# Patient Record
Sex: MALE | Race: WHITE | NOT HISPANIC OR LATINO | Employment: OTHER | URBAN - METROPOLITAN AREA
[De-identification: names, ages, dates, MRNs, and addresses within clinical notes are randomized per-mention and may not be internally consistent; named-entity substitution may affect disease eponyms.]

---
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
## 2019-01-07 ENCOUNTER — OFFICE VISIT (OUTPATIENT)
Dept: FAMILY MEDICINE CLINIC | Facility: CLINIC | Age: 19
End: 2019-01-07
Payer: COMMERCIAL

## 2019-01-07 VITALS
RESPIRATION RATE: 14 BRPM | SYSTOLIC BLOOD PRESSURE: 108 MMHG | WEIGHT: 154.2 LBS | HEART RATE: 70 BPM | DIASTOLIC BLOOD PRESSURE: 72 MMHG | HEIGHT: 66 IN | TEMPERATURE: 96.5 F | BODY MASS INDEX: 24.78 KG/M2

## 2019-01-07 DIAGNOSIS — M25.50 ARTHRALGIA, UNSPECIFIED JOINT: Primary | ICD-10-CM

## 2019-01-07 PROCEDURE — 3008F BODY MASS INDEX DOCD: CPT | Performed by: FAMILY MEDICINE

## 2019-01-07 PROCEDURE — 99213 OFFICE O/P EST LOW 20 MIN: CPT | Performed by: FAMILY MEDICINE

## 2019-01-07 RX ORDER — MELOXICAM 15 MG/1
15 TABLET ORAL DAILY
Qty: 30 TABLET | Refills: 1 | Status: SHIPPED | OUTPATIENT
Start: 2019-01-07 | End: 2019-02-25

## 2019-01-07 NOTE — PROGRESS NOTES
Assessment/Plan:    Arthralgia  Unclear cause  No signs of inflammation or infection  Pt to obtain labs done at [facility]  Trial of Meloxicam 15mg po qd  F/u with Rheum  Pt to call in 2 weeks with f/u  Pt to call for problems or concerns in the interim       Diagnoses and all orders for this visit:    Arthralgia, unspecified joint  -     meloxicam (MOBIC) 15 mg tablet; Take 1 tablet (15 mg total) by mouth daily          Subjective:      Patient ID: Magaly Mensah is a 22 y o  male  Pt states that his joints are unchanged  Seen by ortho and rheum - labs done were reportedly normal (results not available at time of visit)  Pain always worse the day after exertion and has noticed knee pain/stiffness after a hot shower  Symptoms seem to be sporadic  At worst, pain can cause a limp (6-7/10)  Has not taken any meds for pain  Pt denies morning stiffness, dry mouth, dry eyes or rashes  The following portions of the patient's history were reviewed and updated as appropriate:   He  has no past medical history on file  He   Patient Active Problem List    Diagnosis Date Noted    Arthralgia 66/22/5715    Elbow clicking 12/47/1946    Right elbow pain 07/13/2018    Elbow locking 07/13/2018    Patellofemoral pain syndrome of both knees 05/16/2018     He  has no past surgical history on file  He  reports that he has never smoked  He has never used smokeless tobacco  His alcohol and drug histories are not on file  Current Outpatient Prescriptions   Medication Sig Dispense Refill    meloxicam (MOBIC) 15 mg tablet Take 1 tablet (15 mg total) by mouth daily 30 tablet 1     No current facility-administered medications for this visit  He has No Known Allergies       Review of Systems   Constitutional: Negative  HENT: Negative  Eyes: Negative  Respiratory: Negative  Cardiovascular: Negative  Gastrointestinal: Negative  Genitourinary: Negative      Musculoskeletal: Positive for arthralgias, gait problem (occasional when knee pain is severe) and joint swelling  Negative for myalgias  Skin: Negative  Neurological: Negative for dizziness, weakness, light-headedness and numbness  Objective:      /72   Pulse 70   Temp (!) 96 5 °F (35 8 °C)   Resp 14   Ht 5' 6" (1 676 m)   Wt 69 9 kg (154 lb 3 2 oz)   BMI 24 89 kg/m²          Physical Exam   Constitutional: He is oriented to person, place, and time  He appears well-developed  HENT:   Head: Normocephalic  Mouth/Throat: Oropharynx is clear and moist    Eyes: Pupils are equal, round, and reactive to light  Conjunctivae and EOM are normal    Neck: Normal range of motion  Neck supple  Cardiovascular: Normal rate, regular rhythm, normal heart sounds and intact distal pulses  Pulmonary/Chest: Effort normal and breath sounds normal    Musculoskeletal: Normal range of motion  He exhibits no edema, tenderness or deformity  Lymphadenopathy:     He has no cervical adenopathy  Neurological: He is alert and oriented to person, place, and time  No cranial nerve deficit  Skin: Skin is warm

## 2019-01-08 NOTE — ASSESSMENT & PLAN NOTE
Unclear cause  No signs of inflammation or infection  Pt to obtain labs done at [facility]  Trial of Meloxicam 15mg po qd  F/u with Rheum  Pt to call in 2 weeks with f/u   Pt to call for problems or concerns in the interim

## 2019-01-22 ENCOUNTER — TELEPHONE (OUTPATIENT)
Dept: FAMILY MEDICINE CLINIC | Facility: CLINIC | Age: 19
End: 2019-01-22

## 2019-02-25 ENCOUNTER — OFFICE VISIT (OUTPATIENT)
Dept: RHEUMATOLOGY | Facility: CLINIC | Age: 19
End: 2019-02-25
Payer: COMMERCIAL

## 2019-02-25 VITALS
BODY MASS INDEX: 24.75 KG/M2 | HEART RATE: 74 BPM | DIASTOLIC BLOOD PRESSURE: 84 MMHG | HEIGHT: 66 IN | SYSTOLIC BLOOD PRESSURE: 120 MMHG | WEIGHT: 154 LBS

## 2019-02-25 DIAGNOSIS — M25.562 CHRONIC PAIN OF BOTH KNEES: ICD-10-CM

## 2019-02-25 DIAGNOSIS — M25.561 CHRONIC PAIN OF BOTH KNEES: ICD-10-CM

## 2019-02-25 DIAGNOSIS — G89.29 CHRONIC PAIN OF BOTH KNEES: ICD-10-CM

## 2019-02-25 DIAGNOSIS — M22.2X2 PATELLOFEMORAL PAIN SYNDROME OF BOTH KNEES: ICD-10-CM

## 2019-02-25 DIAGNOSIS — M25.521 BILATERAL ELBOW JOINT PAIN: Primary | ICD-10-CM

## 2019-02-25 DIAGNOSIS — M25.522 BILATERAL ELBOW JOINT PAIN: Primary | ICD-10-CM

## 2019-02-25 DIAGNOSIS — M22.2X1 PATELLOFEMORAL PAIN SYNDROME OF BOTH KNEES: ICD-10-CM

## 2019-02-25 PROCEDURE — 99213 OFFICE O/P EST LOW 20 MIN: CPT | Performed by: INTERNAL MEDICINE

## 2019-02-25 NOTE — PROGRESS NOTES
Assessment and Plan:   Patient is a 71-year-old male with no significant medical issues who presents for rheumatology follow-up regarding bilateral elbow and knee  Workup was reviewed with him as be low which revealed negative serologies for autoimmune diseases including negative HLA B27 and also normal inflammatory markers  His exam today is generally normal and he has no evidence of joint swelling or inflammatory arthritis on exam   The etiology of his elbow pain is unclear but does not appear related to inflammatory arthritis  Has had normal imaging of his elbows including a normal right elbow MRI  His knee pain seems like it could be consistent with patellofemoral syndrome and he has done physical therapy for this in the past which he reports did temporarily improve it immediately following physical therapy but then ultimately it went back to how it was previously  Given lack of evidence for an underlying inflammatory arthritis or autoimmune disease, I discussed that he does not need additional Rheumatology follow-up  Another option would be to return to sports medicine Orthopedics to see if an injection in his elbows would be beneficial, but it is unclear that this would help given the normal elbow imaging  Patient was agreeable with this plan and had no additional questions  Plan:  Diagnoses and all orders for this visit:    Bilateral elbow joint pain    Chronic pain of both knees    Patellofemoral pain syndrome of both knees        Follow-up plan:  No rheumatology follow up needed       Rheumatic Disease Summary  1   Polyarthralgia, livedo reticularis  -Rheum eval 11/2018 for non-inflammatory arthralgias worsened with activity (mainly elbows/knees), livedo reticularis  -XR elbows 2018: normal  -XR left knee 2018: moderate medial and lateral compartment narrowing without significant spurring  -MRI right elbow 7/2018: normal   -Labs 2018: negative BREE, RF, CCP, HLA-B27, SSA, SSB, Hep B/C, APLs PAIGE Bach is a 22 y o   male who presents for rheumatology follow-up regarding bilateral elbow and knee pain  I evaluated him last time for these complaints after he was seen by Sports Medicine, and there value a shin was normal with normal imaging, particularly right elbow MRI was completely normal   He had the lab workup I requested which was reviewed with him again today and was completely normal   He continues to complain of bilateral elbow pain and elbow clicking, which she notices most when he is using his elbows  He also has similar pain in his bilateral knees which is present when he is more active  Denies any morning stiffness whatsoever  Has not noticed any joint swelling  Denies any new joint complaints  He again denies any rashes, psoriasis, inflammatory bowel disease, or eye issues  He saw his primary care doctor in the meantime who prescribed meloxicam which she took for about 2 weeks but states he stopped it because it did not make a difference  He Is not taking any over-the-counter medications  He also states he is in the [facility] and is currently on full profile" which means he is under surveillance given his ongoing complaints  He is hopeful to come off profile and resume his normal activities and program     The following portions of the patient's history were reviewed and updated as appropriate: allergies, current medications, past family history, past medical history, past social history, past surgical history and problem list     Review of Systems:   Review of Systems   Constitutional: Negative for chills, fatigue, fever and unexpected weight change  HENT: Negative for mouth sores and trouble swallowing  Eyes: Negative for pain and visual disturbance  Respiratory: Negative for cough and shortness of breath  Cardiovascular: Negative for chest pain and leg swelling     Gastrointestinal: Negative for abdominal pain, blood in stool, constipation, diarrhea and nausea  Genitourinary: Negative for hematuria  Musculoskeletal: Positive for arthralgias  Negative for back pain, joint swelling and myalgias  Skin: Negative for rash  Neurological: Negative for weakness and numbness  Hematological: Negative for adenopathy  Psychiatric/Behavioral: Negative for sleep disturbance  Home Medications:  No current outpatient medications on file  Objective:    Vitals:    02/25/19 0924   BP: 120/84   BP Location: Left arm   Patient Position: Sitting   Cuff Size: Standard   Pulse: 74   Weight: 69 9 kg (154 lb)   Height: 5' 6" (1 676 m)       Physical Exam   Constitutional: He appears well-developed and well-nourished  He is cooperative  No distress  HENT:   Head: Normocephalic  Mouth/Throat: Oropharynx is clear and moist and mucous membranes are normal    Eyes: Conjunctivae and EOM are normal  No scleral icterus  Neck: No thyromegaly present  Pulmonary/Chest: No respiratory distress  Musculoskeletal:   No significant soft tissue tenderness  Elbows are generally nontender and remaining joints are nontender without swelling or synovitis  Has no significant joint hypermobility  Lymphadenopathy:     He has no cervical adenopathy  Neurological: He is alert  He has normal strength  Skin: Skin is warm and dry  No rash noted  Nails show no clubbing  Psychiatric: He has a normal mood and affect       Labs:   Labs reviewed: negative BREE, RF, CCP, HLA-B27, SSA, SSB, Hep B/C, APLs, normal TSH, CMP, CBC, ESR, CRP

## 2019-02-25 NOTE — LETTER
February 25, 2019     Penny Quevedo MD  [id number]  [address] [place] [address]    Patient: Rosemary Williamson   YOB: 2000   Date of Visit: 2/25/2019       Dear Dr Edmond Tidwell:    Thank you for referring John Barnhart to me for evaluation  Below are my notes for this consultation  If you have questions, please do not hesitate to call me  Sincerely,        DO Lula Cai DO  2/25/2019 10:07 AM  Signed  Assessment and Plan:   Patient is a 69-year-old male with no significant medical issues who presents for rheumatology follow-up regarding bilateral elbow and knee  Workup was reviewed with him as be low which revealed negative serologies for autoimmune diseases including negative HLA B27 and also normal inflammatory markers  His exam today is generally normal and he has no evidence of joint swelling or inflammatory arthritis on exam   The etiology of his elbow pain is unclear but does not appear related to inflammatory arthritis  Has had normal imaging of his elbows including a normal right elbow MRI  His knee pain seems like it could be consistent with patellofemoral syndrome and he has done physical therapy for this in the past which he reports did temporarily improve it immediately following physical therapy but then ultimately it went back to how it was previously  Given lack of evidence for an underlying inflammatory arthritis or autoimmune disease, I discussed that he does not need additional Rheumatology follow-up  Another option would be to return to sports medicine Orthopedics to see if an injection in his elbows would be beneficial, but it is unclear that this would help given the normal elbow imaging  Patient was agreeable with this plan and had no additional questions      Plan:  Diagnoses and all orders for this visit:    Bilateral elbow joint pain    Chronic pain of both knees    Patellofemoral pain syndrome of both knees        Follow-up plan:  No rheumatology follow up needed       Rheumatic Disease Summary  1  Polyarthralgia, livedo reticularis  -Rheum eval 11/2018 for non-inflammatory arthralgias worsened with activity (mainly elbows/knees), livedo reticularis  -XR elbows 2018: normal  -XR left knee 2018: moderate medial and lateral compartment narrowing without significant spurring  -MRI right elbow 7/2018: normal   -Labs 2018: negative BREE, RF, CCP, HLA-B27, SSA, SSB, Hep B/C, APLs     HPI  Malcolm Prieto is a 22 y o   male who presents for rheumatology follow-up regarding bilateral elbow and knee pain  I evaluated him last time for these complaints after he was seen by Sports Medicine, and there value a shin was normal with normal imaging, particularly right elbow MRI was completely normal   He had the lab workup I requested which was reviewed with him again today and was completely normal   He continues to complain of bilateral elbow pain and elbow clicking, which she notices most when he is using his elbows  He also has similar pain in his bilateral knees which is present when he is more active  Denies any morning stiffness whatsoever  Has not noticed any joint swelling  Denies any new joint complaints  He again denies any rashes, psoriasis, inflammatory bowel disease, or eye issues  He saw his primary care doctor in the meantime who prescribed meloxicam which she took for about 2 weeks but states he stopped it because it did not make a difference  He Is not taking any over-the-counter medications  He also states he is in the [facility] and is currently on full profile" which means he is under surveillance given his ongoing complaints    He is hopeful to come off profile and resume his normal activities and program     The following portions of the patient's history were reviewed and updated as appropriate: allergies, current medications, past family history, past medical history, past social history, past surgical history and problem list     Review of Systems:   Review of Systems   Constitutional: Negative for chills, fatigue, fever and unexpected weight change  HENT: Negative for mouth sores and trouble swallowing  Eyes: Negative for pain and visual disturbance  Respiratory: Negative for cough and shortness of breath  Cardiovascular: Negative for chest pain and leg swelling  Gastrointestinal: Negative for abdominal pain, blood in stool, constipation, diarrhea and nausea  Genitourinary: Negative for hematuria  Musculoskeletal: Positive for arthralgias  Negative for back pain, joint swelling and myalgias  Skin: Negative for rash  Neurological: Negative for weakness and numbness  Hematological: Negative for adenopathy  Psychiatric/Behavioral: Negative for sleep disturbance  Home Medications:  No current outpatient medications on file  Objective:    Vitals:    02/25/19 0924   BP: 120/84   BP Location: Left arm   Patient Position: Sitting   Cuff Size: Standard   Pulse: 74   Weight: 69 9 kg (154 lb)   Height: 5' 6" (1 676 m)       Physical Exam   Constitutional: He appears well-developed and well-nourished  He is cooperative  No distress  HENT:   Head: Normocephalic  Mouth/Throat: Oropharynx is clear and moist and mucous membranes are normal    Eyes: Conjunctivae and EOM are normal  No scleral icterus  Neck: No thyromegaly present  Pulmonary/Chest: No respiratory distress  Musculoskeletal:   No significant soft tissue tenderness  Elbows are generally nontender and remaining joints are nontender without swelling or synovitis  Has no significant joint hypermobility  Lymphadenopathy:     He has no cervical adenopathy  Neurological: He is alert  He has normal strength  Skin: Skin is warm and dry  No rash noted  Nails show no clubbing  Psychiatric: He has a normal mood and affect       Labs:   Labs reviewed: negative BREE, RF, CCP, HLA-B27, SSA, SSB, Hep B/C, APLs, normal TSH, CMP, CBC, ESR, CRP

## 2019-03-22 ENCOUNTER — OFFICE VISIT (OUTPATIENT)
Dept: FAMILY MEDICINE CLINIC | Facility: CLINIC | Age: 19
End: 2019-03-22
Payer: COMMERCIAL

## 2019-03-22 VITALS
WEIGHT: 152.2 LBS | HEART RATE: 74 BPM | SYSTOLIC BLOOD PRESSURE: 110 MMHG | DIASTOLIC BLOOD PRESSURE: 72 MMHG | TEMPERATURE: 97.7 F | BODY MASS INDEX: 24.46 KG/M2 | HEIGHT: 66 IN

## 2019-03-22 DIAGNOSIS — M25.50 ARTHRALGIA, UNSPECIFIED JOINT: Primary | ICD-10-CM

## 2019-03-22 DIAGNOSIS — R25.3 FASCICULATION: ICD-10-CM

## 2019-03-22 PROBLEM — F51.01 PRIMARY INSOMNIA: Status: ACTIVE | Noted: 2019-03-22

## 2019-03-22 PROCEDURE — 99214 OFFICE O/P EST MOD 30 MIN: CPT | Performed by: FAMILY MEDICINE

## 2019-03-22 PROCEDURE — 3008F BODY MASS INDEX DOCD: CPT | Performed by: FAMILY MEDICINE

## 2019-03-22 RX ORDER — AMITRIPTYLINE HYDROCHLORIDE 25 MG/1
25 TABLET, FILM COATED ORAL
Qty: 30 TABLET | Refills: 3 | Status: SHIPPED | OUTPATIENT
Start: 2019-03-22 | End: 2019-10-11

## 2019-03-22 NOTE — LETTER
To Whom It May Concern    Please excuse Amrik BatistaJennyfer from upcoming PT testing   He is undergoing evaluation for persistent joint pain      Sincerely            Lakeisha Quintana MD

## 2019-03-22 NOTE — ASSESSMENT & PLAN NOTE
Patient started on amitriptyline for his arthralgia pain  Will see if q h s  Dosing also helps with his insomnia  Patient will call in 1 week with follow-up    Recheck 2 months

## 2019-03-22 NOTE — ASSESSMENT & PLAN NOTE
Non inflammatory  Appreciate Rheumatology evaluation  Will try patient on amitriptyline 25 mg q h s  To see if we can improve joint pains  Patient will call in 1-2 weeks with follow-up    Recheck in 2 months

## 2019-03-22 NOTE — PROGRESS NOTES
Assessment/Plan:    Fasciculation  Exam unremarkable  Refer to Neuro for second opinion    Primary insomnia  Patient started on amitriptyline for his arthralgia pain  Will see if q h s  Dosing also helps with his insomnia  Patient will call in 1 week with follow-up  Recheck 2 months    Arthralgia  Non inflammatory  Appreciate Rheumatology evaluation  Will try patient on amitriptyline 25 mg q h s  To see if we can improve joint pains  Patient will call in 1-2 weeks with follow-up  Recheck in 2 months       Diagnoses and all orders for this visit:    Arthralgia, unspecified joint  -     amitriptyline (ELAVIL) 25 mg tablet; Take 1 tablet (25 mg total) by mouth daily at bedtime    Fasciculation  -     Ambulatory referral to Neurology; Future          Subjective:      Patient ID: Dominga Corea is a 22 y o  male  f/u multiple med issues  - pt continues to have diffuse joint aches  Has seen Rheum for second opinion - no inflammatory cause identified  Over the alst 2 months, pt notes scattered muscles fasciculations  No pain or cramping  Seem to be worse in the afternoon, comes in waves and is more noticeable at rest  Does not prevent activity/limit activity  Does not worsen with activity  No muscle pains  Occurs with peripheral muscles and facial muscles but not trunk    - has issues falling asleep, but not staying asleep  Has not noted increased anxiety or anhedonia/depression  - gets mild headaches when doing sit ups but not with other forms of exercise  No asymmetric weakness/numbness  - knees and elbows seems to be the most frequently painful joints, but can pain in others (ankles, hips)  The following portions of the patient's history were reviewed and updated as appropriate:   He  has no past medical history on file    He   Patient Active Problem List    Diagnosis Date Noted    Fasciculation 03/22/2019    Primary insomnia 03/22/2019    Arthralgia 25/33/4432    Elbow clicking 05/68/8358    Right elbow pain 07/13/2018    Elbow locking 07/13/2018    Patellofemoral pain syndrome of both knees 05/16/2018     He  has no past surgical history on file  He  reports that he has never smoked  He has never used smokeless tobacco  His alcohol and drug histories are not on file  Current Outpatient Medications   Medication Sig Dispense Refill    amitriptyline (ELAVIL) 25 mg tablet Take 1 tablet (25 mg total) by mouth daily at bedtime 30 tablet 3     No current facility-administered medications for this visit  He has No Known Allergies       Review of Systems   Constitutional: Negative  HENT: Negative  Eyes: Negative  Respiratory: Negative  Cardiovascular: Negative  Gastrointestinal: Negative  Genitourinary: Negative  Musculoskeletal: Positive for arthralgias, gait problem (occasional when knee pain is severe) and joint swelling  Negative for myalgias  Skin: Negative  Neurological: Negative for dizziness, weakness, light-headedness and numbness  Muscle fasciculations   Psychiatric/Behavioral: Positive for sleep disturbance  Negative for dysphoric mood  The patient is not nervous/anxious  Objective:      /72   Pulse 74   Temp 97 7 °F (36 5 °C)   Ht 5' 6" (1 676 m)   Wt 69 kg (152 lb 3 2 oz)   BMI 24 57 kg/m²          Physical Exam   Constitutional: He is oriented to person, place, and time  He appears well-developed  HENT:   Head: Normocephalic  Mouth/Throat: Oropharynx is clear and moist    Eyes: Pupils are equal, round, and reactive to light  Conjunctivae and EOM are normal    Neck: Normal range of motion  Neck supple  Cardiovascular: Normal rate, regular rhythm, normal heart sounds and intact distal pulses  Pulmonary/Chest: Effort normal and breath sounds normal    Musculoskeletal: Normal range of motion  He exhibits no edema, tenderness or deformity  Sl TTP over the medial epicodyles bilat   Rest (-)   Lymphadenopathy:     He has no cervical adenopathy  Neurological: He is alert and oriented to person, place, and time  No cranial nerve deficit  PHQ-2 = 0   Skin: Skin is warm

## 2019-03-27 NOTE — PROGRESS NOTES
DEPARTMENT OF NEUROLOGICAL SCIENCES  [address] and [facility]        NEW PATIENT EVALUATION NOTE    Patient: Lucretia Reyes  Medical Record Number: # [id number]  YOB: 2000  Date of visit: 3/29/2019    Referring provider: Compa Canales*    ASSESSMENT     Diagnoses for this encounter:  1  Benign fasciculations  Ambulatory referral to Neurology    carbamazepine (CARBATROL) 100 MG 12 hr capsule    Comprehensive metabolic panel    Magnesium    Vitamin B12    Vitamin D 25 hydroxy    PTH, intact    EMG 1 Upper/1 Lower Neuropathy    Calcium, ionized     Impression of this 21 yo M with ~ 3 month history of progressing muscle fasciculations reportedly across his body from his thumb before manifesting bilaterally in his upper legs, biceps and face  He also has the history of diffuse arthralgias, the severity and timing of which are not correlated with the onset or progression of the spasms  I feel they are two separate issues at this point  Today I was unable to fully witness any of these twitches personally, though he subjectively felt them in his R calf  His neuro exam showed normal strength testing, normal bulk and tone of limbs, hypoactive to absent reflexes bilaterally, and no neuropathic sensory changes  This is reassuring and given his history, most resembles a benign fasciculation syndrome  He feels the same muscle groups are involved each time  There are no upper motor neuron signs to support a degenerative process such as ALS or PLS per se, but the rapid spread of limb involvement is the only concerning aspect  Twitches are more reliably manifest in the biceps and gastrocnemius he feels  He does not have associated cramping pain; pain is localized to the major joints themselves  Will proceed as below  PLAN     · Reviewed the relevant previous lab work in [address] [place] [address] Rd  · Will check on CMP electrolyte panel, magnesium, PTH, vit D, vit B12     · No recent or relevant neuroimaging results to review  He had normal scans of his limbs  If develops upper motor neuron signs, consider MRI cervical spine  · Will proceed with an EMG of the LEs (calves) though he has them in biceps too  · Currently the twitches are more widespread, but not interfering with his daily life  They are annoying to him so will attempt trial of carbamazepine with baclofen as alternative  · Start on carbamazepine 100mg twice a day for symptomatic treatment of fasciculations  We do not think the joint pain is related to the fasciculations  · Thank you very much for sending me this interesting patient  · The patient has been instructed to call us about any new neurological problems or medication side effects  · Return to Clinic in 3-4 months after above completed  A total of 60 minutes were spent face-to-face with this patient, of which 25% was spent on counseling and coordination of care  We discussed the natural history of the patient's condition, differential diagnosis, level of diagnostic certainty, treatment alternatives and their side effects and possible complications  HISTORY OF PRESENT ILLNESS:     Mr Clarice De Leon is a 22 y o  right handed male who has been referred to the [facility] for 2nd opinion of muscle twitches  The patient was not accompanied today  History was obtained from patient    Main bothersome neurological symptoms today are:   1  Various muscle twitching  He tells me his L thumb started twitching Winter 2018 then spreading to both arms, and then both calves and hamstrings  In the last week or so, he feels twitches on his abdomen in the same muscle group and his face (mainly L > R) in cheeks  He denies noticing any overt weakness of the arms or legs  The twitching in the upper extremities appear only in the biceps and in thumbs, equally  In the LEs appears in the calves and hamstrings   In the face, twitches are below eyes in the cheek and lip, L>R  These occur upwards of several times a day and randomly  He is able to walk normally and carry out his daily life tasks well  He denies previous injury to the neck or head  Twitches are not triggered on any position change or time of day  Pt complains of diffuse joint aches since early 2018 of thus far unknown etiology  Has seen Rheumatology twice for second opinion - no inflammatory cause identified  He has seen Orthopedic Surgery as well with no clear etiology of an orthopedic nature  He has had negative serologies for autoimmune diseases including negative HLA B27 and also normal inflammatory markers  Negative Lyme and Hepatitis C  Normal imaging of his elbows including a normal right elbow MRI  Previous PT helped with knee pain temporarily, but then returned to previous level  - He endorses issues falling asleep, but not staying asleep  Has not noted increased anxiety or anhedonia/depression  - He gets mild headaches when doing sit ups but not with other forms of exercise  No asymmetric weakness/numbness  - His knees and elbows seems to be the most frequently painful joints, but can pain in others (ankles, hips)  - his joints ache more after use, but sometimes aches can occur without provocation    - He does not notice any correlation between the severity of the joint pain with the degree and extent of muscle twitching    - Family history of "weird looking joints" in elbows of both brother and father, and brother cannot rotate his palms upward  - He was started on Elavil 25mg qHS as a trial  He feels that the pain is better with this, but no effect on twitching  He tried meloxicam earlier for the joint pain, without benefit  He has not tried baclofen nor tizanidine before  Living Situation + ADLs: Goes to college studying mechanical tech  Academics good  He feels he is eating well, drinking less than 64 oz of water   He does endorse sleeping regularly at 10-11pm and wakening 6-8am  He is otherwise capable of performing all his daily tasks ADLs and IADLs  Family History: Number of First Degree Relatives With Parkinsonism/Dementia:  Paternal Grandfather with possible Parkinson's and dementia  REVIEW OF PAST MEDICAL, SOCIAL AND FAMILY HISTORY:  This is the list of problems as per our Medical Records:    Patient Active Problem List    Diagnosis Date Noted    Fasciculation 03/22/2019    Primary insomnia 03/22/2019    Arthralgia 89/31/2031    Elbow clicking 48/05/0793    Right elbow pain 07/13/2018    Elbow locking 07/13/2018    Patellofemoral pain syndrome of both knees 05/16/2018     History reviewed  No pertinent past medical history  History reviewed  No pertinent surgical history  No Known Allergies     Outpatient Encounter Medications as of 3/29/2019   Medication Sig Dispense Refill    amitriptyline (ELAVIL) 25 mg tablet Take 1 tablet (25 mg total) by mouth daily at bedtime 30 tablet 3    carbamazepine (CARBATROL) 100 MG 12 hr capsule Take 1 capsule (100 mg total) by mouth 2 (two) times a day 60 capsule 3     No facility-administered encounter medications on file as of 3/29/2019  Social History     Tobacco Use    Smoking status: Never Smoker    Smokeless tobacco: Never Used   Substance Use Topics    Alcohol use: Not on file      Family History   Problem Relation Age of Onset    No Known Problems Mother     Parkinsonism Paternal Grandfather       REVIEW OF SYSTEMS:  The patient has entered data on an intake form regarding present illness, past medical and surgical history, medications, allergies, family and social history, and a full review of 14 systems  I have reviewed this form with the patient, and all the relevant information has been included on this note  The full review of systems was negative except as stated in HPI and below  Constitutional: Negative  Negative for appetite change and fever  HENT: Negative    Negative for hearing loss, tinnitus, trouble swallowing and voice change  Eyes: Negative  Negative for photophobia and pain  Respiratory: Negative  Negative for shortness of breath  Cardiovascular: Negative  Negative for palpitations  Gastrointestinal: Negative  Negative for nausea  Endocrine: Negative  Negative for cold intolerance and heat intolerance  Genitourinary: Negative  Negative for dysuria, frequency and urgency  Musculoskeletal: Positive for myalgias  Negative for neck pain  Skin: Negative  Allergic/Immunologic: Negative  Neurological: Negative for dizziness, tremors, seizures, syncope, facial asymmetry, speech difficulty, weakness and numbness  Positive for twitching  Hematological: Negative  Does not bruise/bleed easily  Psychiatric/Behavioral: Negative  Negative for confusion and hallucinations    PHYSICAL EXAMINATION:     Vital signs:  /70 (BP Location: Left arm, Patient Position: Sitting, Cuff Size: Large)   Pulse 96   Ht 5' 6" (1 676 m)   Wt 69 1 kg (152 lb 6 4 oz)   BMI 24 60 kg/m²     General:  Depressed affect with monotone speech but otherwise well-appearing, well nourished, pleasant patient in no acute distress  Mood and Fund of Knowledge are appropriate  Head:  Normocephalic, atraumatic  Oropharynx and conjunctiva are clear  Speech  No hypophonia, no bradylalia  No scanning speech  Language: Comprehension intact  Neck:  Supple, strong 5/5 forward flexion and retroflexion  Extremities: Range of motion is normal       Cognitive and Mental Exam:  The patient is alert, oriented to self, location, date and situation  Memory is normal to provide accurate details of health history    Cranial Nerves:  CN II:  Direct and consensual light reflexes were equally reactive to light symmetrically  No afferent pupillary defect   Visual fields are full to confrontation  CN III / IV / VI: Extraocular movements were full, with normal pursuit and saccades      CN V: Facial sensation to light touch was intact  CN VII: Face is symmetric with normal strength  CN VIII: Hearing was assessed using the Calibrated Finger Rub Auditory Screening Test (CALFRAST) and was not abnormal (Better than CALFRAST-Strong-70)  CN X:   Palate is up going bilaterally and symmetrically  CN XI:  Neck muscles are strong  CN XII: Tongue protrusion is at midline with normal movements  No dysarthria  Motor:    Dystonia: none  Dyskinesia: none  Myoclonus: none  Chorea: none  Tics: none  MDS-UPDRS III:   Score 0  Speech: 0  Facial Expression: 0  Tremor (Head):  0  Rest Tremor Severity (RUE/LUE/RLE/LLE/Lip): 0/0/0/0/0  Action Tremor of Hands (R): 0  Action Tremor of Hands (L): 0  Finger tapping (R): 0  Finger tapping (L): 0  Hand clenching (R): 0  Hand clenching (L): 0  TISHA Hand (R): 0  TISHA Hand (L): 0  Toe Tapping (R):  0  Toe Tapping (L):  0  Leg Agility (R):  0  Leg Agility (L):  0  Rigidity (Neck): 0  Rigidity (RUE): 0  Rigidity (LUE): 0  Rigidity (RLE): 0  Rigidity (LLE): 0  Arising From Chair: 0  Posture: 0  Gait: 0  Freezing of Gait: 0  Postural Stability: -  Body Bradykinesia: 0  -------------------------------------------------------------------------------------    No fasciculations seen or palpated on the trunk, either limb, legs or face during interview  Unable to bring out with distraction or various activity / positions  Normal symmetrical muscle bulk and tone  No overt joint swelling or erythema  We were able to assess joints without issue       Muscle Strength Right Left  Muscle Strength Right Left   Deltoid 5/5 5/5  Hip Adductors 5/5 5/5   Biceps 5/5 5/5  Hip Abductors 5/5 5/5   Triceps 5/5 5/5  Knee Extensors 5/5 5/5   Wrist Extensors 5/5 5/5  Knee Flexors 5/5 5/5   Wrist Flexors 5/5 5/5  Ankle Extensors 5/5 5/5    5/5 5/5  Ankle Flexors 5/5 5/5   Finger Abductors 5/5 5/5       Hip Flexors 5/5 5/5   Hip Extensors 5/5 5/5     Sensory  Intact to Light Touch, Temperature, and vibration sense in all extremities  Coordination:  Finger-to-nose-finger: normal     Gait:  Normal comprehensive gait evaluation, has normal raising, stance, gait, turns, tandem gait, tip-toes, heels  Reflexes:    Right Left   Biceps 1/4 1/4   Brachioradialis 0/4 0/4   Triceps 1/4 1/4   Knee 0/4 0/4   Ankle 0/4 0/4      Plantar cutaneous reflex:  Right: mute  Left: mute      REVIEW OF ANCILLARY TESTS:   No results found for this or any previous visit

## 2019-03-29 ENCOUNTER — CONSULT (OUTPATIENT)
Dept: NEUROLOGY | Facility: CLINIC | Age: 19
End: 2019-03-29
Payer: COMMERCIAL

## 2019-03-29 VITALS
WEIGHT: 152.4 LBS | BODY MASS INDEX: 24.49 KG/M2 | SYSTOLIC BLOOD PRESSURE: 108 MMHG | HEIGHT: 66 IN | DIASTOLIC BLOOD PRESSURE: 70 MMHG | HEART RATE: 96 BPM

## 2019-03-29 DIAGNOSIS — R25.3 BENIGN FASCICULATIONS: Primary | ICD-10-CM

## 2019-03-29 DIAGNOSIS — M25.562 ARTHRALGIA OF BOTH KNEES: ICD-10-CM

## 2019-03-29 DIAGNOSIS — M25.561 ARTHRALGIA OF BOTH KNEES: ICD-10-CM

## 2019-03-29 PROCEDURE — 99244 OFF/OP CNSLTJ NEW/EST MOD 40: CPT | Performed by: PSYCHIATRY & NEUROLOGY

## 2019-03-29 RX ORDER — CARBAMAZEPINE 100 MG/1
100 CAPSULE, EXTENDED RELEASE ORAL 2 TIMES DAILY
Qty: 60 CAPSULE | Refills: 3 | Status: SHIPPED | OUTPATIENT
Start: 2019-03-29 | End: 2019-10-11

## 2019-03-29 NOTE — PATIENT INSTRUCTIONS
· Reviewed the relevant previous lab work in [employer]  · Will check on CMP electrolyte panel, magnesium, PTH, vit D, vit B12  · No recent or relevant neuroimaging results to review  He had normal scans of his limbs  · Will proceed with an EMG of the LEs (calves) though he has them in biceps too  · Currently the twitches are more widespread, but not interfering with his daily life  They are annoying to him so will attempt trial of carbamazepine with baclofen as alternative  · Start on carbamazepine 100mg twice a day for symptomatic treatment of fasciculations  We do not think the joint pain is related to the fasciculations  · Thank you very much for sending me this interesting patient  · The patient has been instructed to call us about any new neurological problems or medication side effects  · Return to Clinic in 3-4 months after above completed

## 2019-03-29 NOTE — LETTER
March 29, 2019     Julia Milton MD  [address]    Patient: Jesus Martinez   YOB: 2000   Date of Visit: 3/29/2019       Dear Dr Niranjan Chanel:    Thank you for referring Domitila Rivera to me for evaluation  Below are my notes for this consultation  If you have questions, please do not hesitate to call me  I look forward to following your patient along with you  Sincerely,        Rhonda Torres MD        CC: No Recipients  Denise Flavin  3/29/2019  8:30 AM  Sign at close encounter  Review of Systems   Constitutional: Negative  Negative for appetite change and fever  HENT: Negative  Negative for hearing loss, tinnitus, trouble swallowing and voice change  Eyes: Negative  Negative for photophobia and pain  Respiratory: Negative  Negative for shortness of breath  Cardiovascular: Negative  Negative for palpitations  Gastrointestinal: Negative  Negative for nausea  Endocrine: Negative  Negative for cold intolerance and heat intolerance  Genitourinary: Negative  Negative for dysuria, frequency and urgency  Musculoskeletal: Positive for myalgias  Negative for neck pain  Skin: Negative  Allergic/Immunologic: Negative  Neurological: Negative for dizziness, tremors, seizures, syncope, facial asymmetry, speech difficulty, weakness and numbness  Positive for twitching     Hematological: Negative  Does not bruise/bleed easily  Psychiatric/Behavioral: Negative  Negative for confusion and hallucinations         Rhonda Torres MD  3/29/2019 12:32 PM  Sign at close encounter  [address]        NEW PATIENT EVALUATION NOTE    Patient: Jesus Martinez  Medical Record Number: # [id number]  YOB: 2000  Date of visit: 3/29/2019    Referring provider: Cheo Benitez    ASSESSMENT     Diagnoses for this encounter:  1  Benign fasciculations  Ambulatory referral to Neurology    carbamazepine (CARBATROL) 100 MG 12 hr capsule    Comprehensive metabolic panel    Magnesium    Vitamin B12    Vitamin D 25 hydroxy    PTH, intact    EMG 1 Upper/1 Lower Neuropathy    Calcium, ionized     Impression of this 23 yo M with ~ 3 month history of progressing muscle fasciculations reportedly across his body from his thumb before manifesting bilaterally in his upper legs, biceps and face  He also has the history of diffuse arthralgias, the severity and timing of which are not correlated with the onset or progression of the spasms  I feel they are two separate issues at this point  Today I was unable to fully witness any of these twitches personally, though he subjectively felt them in his R calf  His neuro exam showed normal strength testing, normal bulk and tone of limbs, hypoactive to absent reflexes bilaterally, and no neuropathic sensory changes  This is reassuring and given his history, most resembles a benign fasciculation syndrome  He feels the same muscle groups are involved each time  There are no upper motor neuron signs to support a degenerative process such as ALS or PLS per se, but the rapid spread of limb involvement is the only concerning aspect  Twitches are more reliably manifest in the biceps and gastrocnemius he feels  He does not have associated cramping pain; pain is localized to the major joints themselves  Will proceed as below  PLAN     · Reviewed the relevant previous lab work in [address] [place] [address] Rd  · Will check on CMP electrolyte panel, magnesium, PTH, vit D, vit B12  · No recent or relevant neuroimaging results to review  He had normal scans of his limbs  If develops upper motor neuron signs, consider MRI cervical spine  · Will proceed with an EMG of the LEs (calves) though he has them in biceps too  · Currently the twitches are more widespread, but not interfering with his daily life   They are annoying to him so will attempt trial of carbamazepine with baclofen as alternative  · Start on carbamazepine 100mg twice a day for symptomatic treatment of fasciculations  We do not think the joint pain is related to the fasciculations  · Thank you very much for sending me this interesting patient  · The patient has been instructed to call us about any new neurological problems or medication side effects  · Return to Clinic in 3-4 months after above completed  A total of 60 minutes were spent face-to-face with this patient, of which 25% was spent on counseling and coordination of care  We discussed the natural history of the patient's condition, differential diagnosis, level of diagnostic certainty, treatment alternatives and their side effects and possible complications  HISTORY OF PRESENT ILLNESS:     Mr Wilton Ruffin is a 22 y o  right handed male who has [facility] [address] [place] [address] for 2nd opinion of muscle twitches  The patient was not accompanied today  History was obtained from patient    Main bothersome neurological symptoms today are:   1  Various muscle twitching  He tells me his L thumb started twitching Winter 2018 then spreading to both arms, and then both calves and hamstrings  In the last week or so, he feels twitches on his abdomen in the same muscle group and his face (mainly L > R) in cheeks  He denies noticing any overt weakness of the arms or legs  The twitching in the upper extremities appear only in the biceps and in thumbs, equally  In the LEs appears in the calves and hamstrings  In the face, twitches are below eyes in the cheek and lip, L>R  These occur upwards of several times a day and randomly  He is able to walk normally and carry out his daily life tasks well  He denies previous injury to the neck or head  Twitches are not triggered on any position change or time of day       Pt complains of diffuse joint aches since early 2018 of thus far unknown etiology  Has seen Rheumatology twice for second opinion - no inflammatory cause identified  He has seen Orthopedic Surgery as well with no clear etiology of an orthopedic nature  He has had negative serologies for autoimmune diseases including negative HLA B27 and also normal inflammatory markers  Negative Lyme and Hepatitis C  Normal imaging of his elbows including a normal right elbow MRI  Previous PT helped with knee pain temporarily, but then returned to previous level  - He endorses issues falling asleep, but not staying asleep  Has not noted increased anxiety or anhedonia/depression  - He gets mild headaches when doing sit ups but not with other forms of exercise  No asymmetric weakness/numbness  - His knees and elbows seems to be the most frequently painful joints, but can pain in others (ankles, hips)  - his joints ache more after use, but sometimes aches can occur without provocation    - He does not notice any correlation between the severity of the joint pain with the degree and extent of muscle twitching    - Family history of "weird looking joints" in elbows of both brother and father, and brother cannot rotate his palms upward  - He was started on Elavil 25mg qHS as a trial  He feels that the pain is better with this, but no effect on twitching  He tried meloxicam earlier for the joint pain, without benefit  He has not tried baclofen nor tizanidine before  Living Situation + ADLs: Goes to college studying mechanical tech  Academics good  He feels he is eating well, drinking less than 64 oz of water  He does endorse sleeping regularly at 10-11pm and wakening 6-8am  He is otherwise capable of performing all his daily tasks ADLs and IADLs  Family History: Number of First Degree Relatives With Parkinsonism/Dementia:  Paternal Grandfather with possible Parkinson's and dementia       REVIEW OF PAST MEDICAL, SOCIAL AND FAMILY HISTORY:  This is the list of problems as per our Medical Records:    Patient Active Problem List    Diagnosis Date Noted    Fasciculation 03/22/2019    Primary insomnia 03/22/2019    Arthralgia 00/82/8055    Elbow clicking 95/88/2619    Right elbow pain 07/13/2018    Elbow locking 07/13/2018    Patellofemoral pain syndrome of both knees 05/16/2018     History reviewed  No pertinent past medical history  History reviewed  No pertinent surgical history  No Known Allergies     Outpatient Encounter Medications as of 3/29/2019   Medication Sig Dispense Refill    amitriptyline (ELAVIL) 25 mg tablet Take 1 tablet (25 mg total) by mouth daily at bedtime 30 tablet 3    carbamazepine (CARBATROL) 100 MG 12 hr capsule Take 1 capsule (100 mg total) by mouth 2 (two) times a day 60 capsule 3     No facility-administered encounter medications on file as of 3/29/2019  Social History     Tobacco Use    Smoking status: Never Smoker    Smokeless tobacco: Never Used   Substance Use Topics    Alcohol use: Not on file      Family History   Problem Relation Age of Onset    No Known Problems Mother     Parkinsonism Paternal Grandfather       REVIEW OF SYSTEMS:  The patient has entered data on an intake form regarding present illness, past medical and surgical history, medications, allergies, family and social history, and a full review of 14 systems  I have reviewed this form with the patient, and all the relevant information has been included on this note  The full review of systems was negative except as stated in HPI and below  Constitutional: Negative  Negative for appetite change and fever  HENT: Negative  Negative for hearing loss, tinnitus, trouble swallowing and voice change  Eyes: Negative  Negative for photophobia and pain  Respiratory: Negative  Negative for shortness of breath  Cardiovascular: Negative  Negative for palpitations  Gastrointestinal: Negative  Negative for nausea  Endocrine: Negative    Negative for cold intolerance and heat intolerance  Genitourinary: Negative  Negative for dysuria, frequency and urgency  Musculoskeletal: Positive for myalgias  Negative for neck pain  Skin: Negative  Allergic/Immunologic: Negative  Neurological: Negative for dizziness, tremors, seizures, syncope, facial asymmetry, speech difficulty, weakness and numbness  Positive for twitching  Hematological: Negative  Does not bruise/bleed easily  Psychiatric/Behavioral: Negative  Negative for confusion and hallucinations    PHYSICAL EXAMINATION:     Vital signs:  /70 (BP Location: Left arm, Patient Position: Sitting, Cuff Size: Large)   Pulse 96   Ht 5' 6" (1 676 m)   Wt 69 1 kg (152 lb 6 4 oz)   BMI 24 60 kg/m²      General:  Depressed affect with monotone speech but otherwise well-appearing, well nourished, pleasant patient in no acute distress  Mood and Fund of Knowledge are appropriate  Head:  Normocephalic, atraumatic  Oropharynx and conjunctiva are clear  Speech  No hypophonia, no bradylalia  No scanning speech  Language: Comprehension intact  Neck:  Supple, strong 5/5 forward flexion and retroflexion  Extremities: Range of motion is normal       Cognitive and Mental Exam:  The patient is alert, oriented to self, location, date and situation  Memory is normal to provide accurate details of health history    Cranial Nerves:  CN II:  Direct and consensual light reflexes were equally reactive to light symmetrically  No afferent pupillary defect   Visual fields are full to confrontation  CN III / IV / VI: Extraocular movements were full, with normal pursuit and saccades  CN V:   Facial sensation to light touch was intact  CN VII: Face is symmetric with normal strength  CN VIII: Hearing was assessed using the Calibrated Finger Rub Auditory Screening Test (CALFRAST) and was not abnormal (Better than CALFRAST-Strong-70)  CN X:   Palate is up going bilaterally and symmetrically     CN XI:  Neck muscles are strong  CN XII: Tongue protrusion is at midline with normal movements  No dysarthria  Motor:    Dystonia: none  Dyskinesia: none  Myoclonus: none  Chorea: none  Tics: none  MDS-UPDRS III:   Score 0  Speech: 0  Facial Expression: 0  Tremor (Head):  0  Rest Tremor Severity (RUE/LUE/RLE/LLE/Lip): 0/0/0/0/0  Action Tremor of Hands (R): 0  Action Tremor of Hands (L): 0  Finger tapping (R): 0  Finger tapping (L): 0  Hand clenching (R): 0  Hand clenching (L): 0  TISHA Hand (R): 0  TISHA Hand (L): 0  Toe Tapping (R):   0  Toe Tapping (L):   0  Leg Agility (R):   0  Leg Agility (L):   0  Rigidity (Neck): 0  Rigidity (RUE): 0  Rigidity (LUE): 0  Rigidity (RLE): 0  Rigidity (LLE): 0  Arising From Chair: 0  Posture: 0  Gait: 0  Freezing of Gait: 0  Postural Stability: -  Body Bradykinesia: 0  -------------------------------------------------------------------------------------    No fasciculations seen or palpated on the trunk, either limb, legs or face during interview  Unable to bring out with distraction or various activity / positions  Normal symmetrical muscle bulk and tone  No overt joint swelling or erythema  We were able to assess joints without issue  Muscle Strength Right Left  Muscle Strength Right Left   Deltoid 5/5 5/5  Hip Adductors 5/5 5/5   Biceps 5/5 5/5  Hip Abductors 5/5 5/5   Triceps 5/5 5/5  Knee Extensors 5/5 5/5   Wrist Extensors 5/5 5/5  Knee Flexors 5/5 5/5   Wrist Flexors 5/5 5/5  Ankle Extensors 5/5 5/5    5/5 5/5  Ankle Flexors 5/5 5/5   Finger Abductors 5/5 5/5       Hip Flexors 5/5 5/5   Hip Extensors 5/5 5/5     Sensory  Intact to Light Touch, Temperature, and vibration sense in all extremities  Coordination:  Finger-to-nose-finger: normal     Gait:  Normal comprehensive gait evaluation, has normal raising, stance, gait, turns, tandem gait, tip-toes, heels        Reflexes:    Right Left   Biceps 1/4 1/4   Brachioradialis 0/4 0/4   Triceps 1/4 1/4   Knee 0/4 0/4   Ankle 0/4 0/4      Plantar cutaneous reflex:  Right: mute  Left: mute      REVIEW OF ANCILLARY TESTS:   No results found for this or any previous visit

## 2019-03-29 NOTE — PROGRESS NOTES
Review of Systems   Constitutional: Negative  Negative for appetite change and fever  HENT: Negative  Negative for hearing loss, tinnitus, trouble swallowing and voice change  Eyes: Negative  Negative for photophobia and pain  Respiratory: Negative  Negative for shortness of breath  Cardiovascular: Negative  Negative for palpitations  Gastrointestinal: Negative  Negative for nausea  Endocrine: Negative  Negative for cold intolerance and heat intolerance  Genitourinary: Negative  Negative for dysuria, frequency and urgency  Musculoskeletal: Positive for myalgias  Negative for neck pain  Skin: Negative  Allergic/Immunologic: Negative  Neurological: Negative for dizziness, tremors, seizures, syncope, facial asymmetry, speech difficulty, weakness and numbness  Positive for twitching     Hematological: Negative  Does not bruise/bleed easily  Psychiatric/Behavioral: Negative  Negative for confusion and hallucinations

## 2019-04-02 LAB
ALBUMIN SERPL-MCNC: 4.9 G/DL (ref 3.6–5.1)
ALBUMIN/GLOB SERPL: 2.6 (CALC) (ref 1–2.5)
ALP SERPL-CCNC: 66 U/L (ref 48–230)
ALT SERPL-CCNC: 17 U/L (ref 8–46)
AST SERPL-CCNC: 17 U/L (ref 12–32)
BILIRUB SERPL-MCNC: 0.7 MG/DL (ref 0.2–1.1)
BUN SERPL-MCNC: 16 MG/DL (ref 7–20)
BUN/CREAT SERPL: ABNORMAL (CALC) (ref 6–22)
CALCIUM SERPL-MCNC: 9.7 MG/DL (ref 8.9–10.4)
CALCIUM SERPL-MCNC: 9.7 MG/DL (ref 8.9–10.4)
CHLORIDE SERPL-SCNC: 103 MMOL/L (ref 98–110)
CO2 SERPL-SCNC: 27 MMOL/L (ref 20–32)
CREAT SERPL-MCNC: 0.87 MG/DL (ref 0.6–1.26)
GLOBULIN SER CALC-MCNC: 1.9 G/DL (CALC) (ref 2.1–3.5)
GLUCOSE SERPL-MCNC: 65 MG/DL (ref 65–99)
MAGNESIUM SERPL-MCNC: 1.9 MG/DL (ref 1.5–2.5)
POTASSIUM SERPL-SCNC: 4.1 MMOL/L (ref 3.8–5.1)
PROT SERPL-MCNC: 6.8 G/DL (ref 6.3–8.2)
PTH-INTACT SERPL-MCNC: 30 PG/ML (ref 14–64)
SL AMB AWN TEST REFUSED: NORMAL
SL AMB EGFR AFRICAN AMERICAN: 145 ML/MIN/1.73M2
SL AMB EGFR NON AFRICAN AMERICAN: 125 ML/MIN/1.73M2
SODIUM SERPL-SCNC: 140 MMOL/L (ref 135–146)
VIT B12 SERPL-MCNC: 634 PG/ML (ref 200–1100)

## 2019-04-04 ENCOUNTER — TELEPHONE (OUTPATIENT)
Dept: NEUROLOGY | Facility: CLINIC | Age: 19
End: 2019-04-04

## 2019-04-24 ENCOUNTER — DOCUMENTATION (OUTPATIENT)
Dept: NEUROLOGY | Facility: CLINIC | Age: 19
End: 2019-04-24

## 2019-05-03 ENCOUNTER — OFFICE VISIT (OUTPATIENT)
Dept: FAMILY MEDICINE CLINIC | Facility: CLINIC | Age: 19
End: 2019-05-03
Payer: COMMERCIAL

## 2019-05-03 VITALS
BODY MASS INDEX: 24.49 KG/M2 | TEMPERATURE: 98.2 F | WEIGHT: 152.4 LBS | SYSTOLIC BLOOD PRESSURE: 102 MMHG | DIASTOLIC BLOOD PRESSURE: 78 MMHG | HEIGHT: 66 IN | HEART RATE: 78 BPM

## 2019-05-03 DIAGNOSIS — M25.562 ARTHRALGIA OF BOTH KNEES: Primary | ICD-10-CM

## 2019-05-03 DIAGNOSIS — M25.561 ARTHRALGIA OF BOTH KNEES: Primary | ICD-10-CM

## 2019-05-03 PROCEDURE — 99213 OFFICE O/P EST LOW 20 MIN: CPT | Performed by: FAMILY MEDICINE

## 2019-05-03 RX ORDER — PREDNISONE 20 MG/1
TABLET ORAL
Qty: 12 TABLET | Refills: 0 | Status: SHIPPED | OUTPATIENT
Start: 2019-05-03 | End: 2019-05-21 | Stop reason: ALTCHOICE

## 2019-05-08 ENCOUNTER — TELEPHONE (OUTPATIENT)
Dept: FAMILY MEDICINE CLINIC | Facility: CLINIC | Age: 19
End: 2019-05-08

## 2019-05-21 ENCOUNTER — OFFICE VISIT (OUTPATIENT)
Dept: FAMILY MEDICINE CLINIC | Facility: CLINIC | Age: 19
End: 2019-05-21
Payer: COMMERCIAL

## 2019-05-21 VITALS
WEIGHT: 154.6 LBS | SYSTOLIC BLOOD PRESSURE: 122 MMHG | TEMPERATURE: 97.9 F | DIASTOLIC BLOOD PRESSURE: 80 MMHG | HEIGHT: 66 IN | HEART RATE: 76 BPM | BODY MASS INDEX: 24.85 KG/M2

## 2019-05-21 DIAGNOSIS — M25.562 CHRONIC PAIN OF BOTH KNEES: Primary | ICD-10-CM

## 2019-05-21 DIAGNOSIS — G89.29 CHRONIC PAIN OF BOTH KNEES: Primary | ICD-10-CM

## 2019-05-21 DIAGNOSIS — M25.561 CHRONIC PAIN OF BOTH KNEES: Primary | ICD-10-CM

## 2019-05-21 PROCEDURE — 20610 DRAIN/INJ JOINT/BURSA W/O US: CPT | Performed by: FAMILY MEDICINE

## 2019-05-21 RX ORDER — METHYLPREDNISOLONE ACETATE 40 MG/ML
1 INJECTION, SUSPENSION INTRA-ARTICULAR; INTRALESIONAL; INTRAMUSCULAR; SOFT TISSUE
Status: COMPLETED | OUTPATIENT
Start: 2019-05-21 | End: 2019-05-21

## 2019-05-21 RX ADMIN — METHYLPREDNISOLONE ACETATE 1 ML: 40 INJECTION, SUSPENSION INTRA-ARTICULAR; INTRALESIONAL; INTRAMUSCULAR; SOFT TISSUE at 13:50

## 2019-05-21 RX ADMIN — METHYLPREDNISOLONE ACETATE 1 ML: 40 INJECTION, SUSPENSION INTRA-ARTICULAR; INTRALESIONAL; INTRAMUSCULAR; SOFT TISSUE at 13:52

## 2019-10-01 ENCOUNTER — TELEPHONE (OUTPATIENT)
Dept: FAMILY MEDICINE CLINIC | Facility: CLINIC | Age: 19
End: 2019-10-01

## 2019-10-01 NOTE — TELEPHONE ENCOUNTER
Was here in may for shots in his knees    He said you had discussed possibly getting shots in his elbows too       He wants to come in sooner than later to get them        Please advise

## 2019-10-02 NOTE — TELEPHONE ENCOUNTER
Patient called back,he can not due this Friday due to drill  He can normally do any time before noon on Wednesdays or anytime on Fridays   What day works best?

## 2019-10-11 ENCOUNTER — OFFICE VISIT (OUTPATIENT)
Dept: FAMILY MEDICINE CLINIC | Facility: CLINIC | Age: 19
End: 2019-10-11
Payer: COMMERCIAL

## 2019-10-11 VITALS
BODY MASS INDEX: 24.27 KG/M2 | WEIGHT: 151 LBS | HEART RATE: 76 BPM | HEIGHT: 66 IN | DIASTOLIC BLOOD PRESSURE: 76 MMHG | SYSTOLIC BLOOD PRESSURE: 116 MMHG | TEMPERATURE: 97.9 F

## 2019-10-11 DIAGNOSIS — M77.8 TRICEPS TENDINITIS: ICD-10-CM

## 2019-10-11 DIAGNOSIS — M24.829 ELBOW LOCKING: Primary | ICD-10-CM

## 2019-10-11 PROCEDURE — 99213 OFFICE O/P EST LOW 20 MIN: CPT | Performed by: FAMILY MEDICINE

## 2019-10-11 PROCEDURE — 3008F BODY MASS INDEX DOCD: CPT | Performed by: FAMILY MEDICINE

## 2019-10-11 NOTE — PROGRESS NOTES
Assessment/Plan:    Elbow locking  With triceps tendinitis by history  Unclear if there is ligamentous laxity and radial head subluxation associated with this locking  MRI failed to show any structural abnormalities  Patient is in the [employer] [facility] [employer] and needs to have elbows improved in order to pass the physical requirements needed for his next promotion  I refer patient back to Dr Wellington Marino at Orthopedics for evaluation  Avoid exacerbating positions in activities in the interim  Diagnoses and all orders for this visit:    Elbow locking  -     Ambulatory referral to Orthopedic Surgery; Future    Triceps tendinitis  -     Ambulatory referral to Orthopedic Surgery; Future          Subjective:      Patient ID: Rashid Santa is a 20 y o  male  70-year-old male here for follow-up of chronic bilateral elbow pain  Pain is been going on for several years  He has seen Orthopedics and has gone through physical therapy  Pain is primarily in the triceps tendon insertion bilaterally  Patient also notes occasional painful locking of his elbows with certain positions  Locking seems to occur when elbow is flexed at least 90°, wrist or rotating, and he is either caring something or pushing something  MRI of the right elbow in 2018 was unremarkable for bone chip, bone abnormalities, or tendon/ligament abnormalities  Physical therapy would help for a short time but pain and locking would return  Patient was compliant with home exercises  Patient denies any proximal or distal muscle or joint issues  The following portions of the patient's history were reviewed and updated as appropriate:   He  has no past medical history on file    He   Patient Active Problem List    Diagnosis Date Noted    Fasciculation 03/22/2019    Primary insomnia 03/22/2019    Arthralgia 85/31/3712    Elbow clicking 97/35/1271    Right elbow pain 07/13/2018    Elbow locking 07/13/2018    Patellofemoral pain syndrome of both knees 05/16/2018     He  has no past surgical history on file  He  reports that he has never smoked  He has never used smokeless tobacco  His alcohol and drug histories are not on file  No current outpatient medications on file  No current facility-administered medications for this visit  He has No Known Allergies       Review of Systems   Constitutional: Negative  Musculoskeletal: Positive for arthralgias and myalgias  Negative for joint swelling  Neurological: Negative for weakness and numbness  Objective:      /76   Pulse 76   Temp 97 9 °F (36 6 °C)   Ht 5' 6" (1 676 m)   Wt 68 5 kg (151 lb)   BMI 24 37 kg/m²          Physical Exam   Constitutional: He appears well-developed and well-nourished  Cardiovascular: Intact distal pulses  Musculoskeletal: Normal range of motion  bilat elbows with out any tender areas to palpation  Good ROM in both flexion/extension as well as pronation/supination of the wrists/hands  No crepitus or locking appreciated  Neurovasc intact distally   Neurological: No sensory deficit  He exhibits normal muscle tone

## 2019-10-11 NOTE — ASSESSMENT & PLAN NOTE
With triceps tendinitis by history  Unclear if there is ligamentous laxity and radial head subluxation associated with this locking  MRI failed to show any structural abnormalities  Patient is in the [employer] [employer] and needs to have elbows improved in order to pass the physical requirements needed for his next promotion  I refer patient back to Dr Yesica Presley at Orthopedics for evaluation  Avoid exacerbating positions in activities in the interim

## 2019-10-30 ENCOUNTER — CONSULT (OUTPATIENT)
Dept: OBGYN CLINIC | Facility: OTHER | Age: 19
End: 2019-10-30
Payer: COMMERCIAL

## 2019-10-30 VITALS
HEART RATE: 56 BPM | WEIGHT: 154 LBS | HEIGHT: 66 IN | SYSTOLIC BLOOD PRESSURE: 116 MMHG | BODY MASS INDEX: 24.75 KG/M2 | DIASTOLIC BLOOD PRESSURE: 77 MMHG

## 2019-10-30 DIAGNOSIS — M24.829 ELBOW LOCKING: ICD-10-CM

## 2019-10-30 DIAGNOSIS — M77.8 TRICEPS TENDINITIS: Primary | ICD-10-CM

## 2019-10-30 PROCEDURE — 99213 OFFICE O/P EST LOW 20 MIN: CPT | Performed by: ORTHOPAEDIC SURGERY

## 2019-10-30 NOTE — PROGRESS NOTES
Orthopaedic Surgery - Office Note  Alex Rouse (20 y o  male)   : 2000   MRN: [id number]  Encounter Date: 10/30/2019    Chief Complaint   Patient presents with    Left Elbow - Pain    Right Elbow - Pain       Assessment / Plan  Bilateral elbow pain and locking, triceps tendonitis    · Resume HEP that was previously done, new script for PT was provided today  · Avoid heavy lifting  · Anti-inflammatories and ice as needed for pain  Return if symptoms worsen or fail to improve  History of Present Illness  Timoteo Rosenthal is a 20 y o  male who presents for evaluation of his bilateral elbows  He notes 2 years of bilateral elbow pain, right worse than left  He complains of locking and popping, especially with WB  Pain is localized posterior elbow  This does not radiate and is not associated with numbness or tingling  He is in the [employer] and needs to perform push ups and other physical fitness requirements  He denies any injury or trauma to bring this on  He has been treated by our Sports Medicine team and referred to Rheumatology whom found all labs within normal limits  He also had x-ray and MRI that were read as normal as well  He has completed a 6 week course of PT with minimal relief  He continued with HEP but admits he did not continue this for long  Review of Systems  Pertinent items are noted in HPI  All other systems were reviewed and are negative  Physical Exam  /77   Pulse 56   Ht 5' 6" (1 676 m)   Wt 69 9 kg (154 lb)   BMI 24 86 kg/m²   Cons: Appears well  No apparent distress  Psych: Alert  Oriented x3  Mood and affect normal   Eyes: PERRLA, EOMI  Resp: Normal effort  No audible wheezing or stridor  CV: Palpable pulse  No discernable arrhythmia  No LE edema  Lymph:  No palpable cervical, axillary, or inguinal lymphadenopathy  Skin: Warm  No palpable masses  No visible lesions  Neuro: Normal muscle tone  Normal and symmetric DTR's       Bilateral Elbow Exam  Alignment:  Normal elbow alignment and carrying angle  Inspection:  No swelling  Palpation:  No tenderness  ROM:  Elbow Extension 0  Elbow Flexion 145  Pronation 70 Supination 70  Lateral click with forearm rotation on right side  Strength:  5/5 biceps and triceps  Stability:  (-) Varus instability  (-) Valgus instability  Tests:  (-) Moving valgus stress test  Neurovascular:  Sensation intact in Ax/R/M/U nerve distributions  2+ radial pulse  Brisk capillary refill in all fingertips  Fingers warm and perfused  Studies Reviewed  I have personally reviewed pertinent films in PACS and my interpretation is both x-ray and MRI add of the elbow were unremarkable studies  Procedures  No procedures today  Medical, Surgical, Family, and Social History  The patient's medical history, family history, and social history, were reviewed and updated as appropriate  History reviewed  No pertinent past medical history  History reviewed  No pertinent surgical history  Family History   Problem Relation Age of Onset    No Known Problems Mother     Parkinsonism Paternal Grandfather        Social History     Occupational History    Not on file   Tobacco Use    Smoking status: Never Smoker    Smokeless tobacco: Never Used   Substance and Sexual Activity    Alcohol use: Not on file    Drug use: Not on file    Sexual activity: Not on file       No Known Allergies    No current outpatient medications on file        Michael Dooley MA    Scribe Attestation    I,:   Michael Dooley MA am acting as a scribe while in the presence of the attending physician :        I,:   Jorje Rm MD personally performed the services described in this documentation    as scribed in my presence :

## 2019-11-12 NOTE — PROGRESS NOTES
PT Evaluation     Today's date: 2019  Patient name: Lucio Gomez  : 2000  MRN: [id number]  Referring provider: Ajit Holden MD  Dx: No diagnosis found  Assessment  Assessment details: Lucio Gomez is a pleasant 20 y o  male who presents with ***  The patient's greatest concerns are {Concerns:44485::"worry over not knowing what's wrong","fear of not being able to keep active"}  {referral:59352}  Primary movement impairment diagnosis of *** resulting in pathoanatomical symptoms of *** and limiting his ability to {skfunction:57834}  Primary Impairments:  1) ***  2) ***    Etiologic factors include {causes; back pain:04041}  Symptom irritability: moderateUnderstanding of Dx/Px/POC: good   Prognosis: good  Prognosis details: Positive prognostic indicators include {PRECAUTIONS:78459::"positive attitude toward recovery"}  Negative prognostic indicators include {PRECAUTIONS:63721::"chronicity of symptoms","anxiety","hypertension","high symptom irritability","obesity"}  Goals  Patient will be independent with home exercise program    Patient will be able to manage symptoms independently  Plan  Patient would benefit from: skilled physical therapy  Planned modality interventions: Modalities PRN    Planned therapy interventions: activity modification, manual therapy, neuromuscular re-education, patient education, therapeutic activities, therapeutic exercise, graded activity, home exercise program, behavior modification and self care  Frequency: 2x week  Duration in weeks: 6  Treatment plan discussed with: patient        Subjective Evaluation    History of Present Illness  Mechanism of injury: History of Current Injury: ***   Surgery date: ***  Pain location/Descriptors: ***  Aggravating factors: ***  Easing factors: ***  24 HR pattern: ***  Imaging: ***  Special Questions: ***  Patient goals:  ***  Occupation: ***          Objective           Precautions: N/A      Manual  11/13                                                                                 Exercise Diary  11/13                                                                                                                                                                                                                                                      Assessment IE            Education HEP, POC                Modalities

## 2019-11-13 ENCOUNTER — EVALUATION (OUTPATIENT)
Dept: PHYSICAL THERAPY | Facility: CLINIC | Age: 19
End: 2019-11-13
Payer: COMMERCIAL

## 2019-11-13 DIAGNOSIS — M77.8 TRICEPS TENDINITIS: ICD-10-CM

## 2019-11-13 PROCEDURE — 97161 PT EVAL LOW COMPLEX 20 MIN: CPT | Performed by: PHYSICAL THERAPIST

## 2019-11-13 PROCEDURE — 97140 MANUAL THERAPY 1/> REGIONS: CPT | Performed by: PHYSICAL THERAPIST

## 2019-11-13 NOTE — PROGRESS NOTES
PT Evaluation      Today's date: 2019  Patient name: Penny Bradford  : 2000  MRN: [id number]  Referring provider: Tiffanie Rae MD  Dx: No diagnosis found  Assessment   Assessment details: Penny Bradford is a pleasant 20 y o  male who presents with B elbow pain over a 2 year duration  The patient's greatest concerns are the pain he is experiencing, concern at no signs of improvement and fear of not being able to keep active  No further referral appears necessary at this time based upon examination results  Primary movement impairment diagnosis of elbow joint complex hypomobility resulting in pathoanatomical symptoms of triceps tendonitis and joint crepitus and limiting his ability to exercise or recreation and get out of a chair  Primary Impairments:  1) Restricted R elbow flex/ext  2) Bilateral hypomobility in humeroradial and humeroulnar joints    Etiologic factors include increased repetitive loading during [email or web address] >2 years ago  Impairments: abnormal or restricted ROM, impaired physical strength, lacks appropriate home exercise program, pain with function and weight-bearing intolerance    Symptom irritability: moderateUnderstanding of Dx/Px/POC: good   Prognosis: good  Prognosis details: Positive prognostic indicators include absence of peripheralization and absence of observed red flags  Negative prognostic indicators include chronicity of symptoms, minimal changes in pain with movement, multiple concurrent orthopedic problems and multiple prior failed treatments  Goals   Patient will be independent with home exercise program    Patient will be able to manage symptoms independently       STG: to be achieved in 4-6 weeks  1)Reduce pain by 50% in bilateral elbows  2)Improve pain free  strength by symmetrical bilaterally  3)Improve flexibility to wrist muscles to WNL     LTG: to be achieved at discharge   1)Improve FOTO to greater than or equal to 75  2)Improve ADL's in related activities to maximal level of function   3)Improve  strength to pain free with maximal force  Plan   Patient would benefit from: skilled physical therapy   Planned modality interventions: Modalities PRN  Planned therapy interventions: activity modification, manual therapy, neuromuscular re-education, patient education, therapeutic activities, therapeutic exercise, graded activity, home exercise program, behavior modification and self care  Frequency: 2x week  Duration in weeks: 6  Treatment plan discussed with: patient  Subjective Evaluation   History of Present Illness   Mechanism of injury: History of Current Injury: The patient reports that he feels as though his elbows "get stuck" when he attempts to straighten his elbows against load, R>L  The patient reports a roughly 2 year history of symptom, reports that it began when he was doing lots of push-ups  Surgery date: N/A  Pain location/Descriptors:  The patient reports popping and clicking in his elbows, reports that he feels it in the joint space, reports pain in the area of the distal tricep  Aggravating factors: Closed chain extension (ex: pushing up from a chair), loaded elbow flex/ext and supination  Easing factors: Brief relief with stretching  24 HR pattern: None noted  Imaging: XR, MRI, lab work for rheumatology work-up  Special Questions: No radiating symptom  Patient goals: Return to full participation in the [employer]  Occupation: The patient is a student at [employer] as well as a member of the [employer]    Pain   Current pain ratin  At best pain ratin  At worst pain ratin  Quality: discomfort, tight, pressure and pulling  Relieving factors: change in position  Aggravating factors: lifting and overhead activity  Social Support   Lives with: parents  Hand dominance: right    Diagnostic Tests   X-ray: normal  MRI studies: normal  Objective   Postural Observations   Seated posture: fair Standing posture: fair   Neurological Testing   Sensation   Elbow   Left Elbow   Inact: light touch   Right Elbow   Intact: light touch   Passive Range of Motion   Left Elbow   Flexion: 150 degrees   Extension: 4 degrees   Forearm supination: 80 degrees   Forearm pronation: 70 degrees   Right Elbow   Flexion: 142 degrees   Extension: -3 degrees   Forearm supination: 75 degrees   Forearm pronation: 70 degrees   Joint Play   Left Elbow   Hypomobile in the humeroulnar joint, humeroradial joint, proximal radioulnar joint and distal radioulnar joint  Right Elbow   Hypomobile in the humeroulnar joint, humeroradial joint, proximal radioulnar joint and distal radioulnar joint  Strength/Myotome Testing   Left Elbow   Flexion: 5  Extension: 4  Forearm supination: 4  Forearm pronation: 4+   Right Elbow   Flexion: 5  Extension: 4  Forearm supination: 4  Forearm pronation: 4+   Left Wrist/Hand    (2nd hand position)   Trial 1: 62  Trial 2: 65  Trial 3: 62   Right Wrist/Hand    (2nd hand position)   Trial 1: 52  Trial 2: 56  Trial 3: 48   Tests   Left Elbow   Negative chair sign, elbow flexion, moving valgus stress, valgus stress at 30 degrees and varus stress at 30 degrees  Right Elbow   Negative chair sign, elbow flexion, moving valgus stress, valgus stress at 30 degrees and varus stress at 30 degrees         Precautions: N/A       Daily Treatment Diary     Manual  11/13            Humeroulnar scoop (gr II-III)             Radiohumeral distract (gr II-III)             Prox radioulnar (gr II-III)                                           Exercise Diary  11/13            Wall push-up             Elbow flex/ext stretch                                                                                                                                                                                                                             Assessment IE            Education HEP, POC                Modalities

## 2019-11-18 ENCOUNTER — OFFICE VISIT (OUTPATIENT)
Dept: PHYSICAL THERAPY | Facility: CLINIC | Age: 19
End: 2019-11-18
Payer: COMMERCIAL

## 2019-11-18 DIAGNOSIS — M77.8 TRICEPS TENDINITIS: Primary | ICD-10-CM

## 2019-11-18 PROCEDURE — 97140 MANUAL THERAPY 1/> REGIONS: CPT | Performed by: PHYSICAL THERAPIST

## 2019-11-18 PROCEDURE — 97112 NEUROMUSCULAR REEDUCATION: CPT | Performed by: PHYSICAL THERAPIST

## 2019-11-18 NOTE — PROGRESS NOTES
Daily Note     Today's date: 2019  Patient name: Esau Nur  : 2000  MRN: [id number]  Referring provider: Shelli Jackson MD  Dx:   Encounter Diagnosis     ICD-10-CM    1  Triceps tendinitis M77 9        Start Time: 0815  Stop Time: 0900  Total time in clinic (min): 45 minutes    Subjective: The patient presents for the first follow-up appointment, reports that symptoms stayed the same and that he was compliant most of the time with the initial HEP        Objective: See treatment diary below      Assessment: The patient tolerated manual and active treatment well today  The PT reviewed the patient's IHEP and introduced a manual and therex intervention program during today's treatment  The patient demonstrated good response to today's treatment  Plan: Continue per plan of care        Daily Treatment Diary     Manual             Humeroulnar scoop (gr II-III)  5 min           Radiohumeral distract (gr II-III)  5 min           Prox radioulnar (gr II-III)  5 min                                         Exercise Diary             Wall push-up             Elbow flex/ext stretch  20 sec x5 ea           Elbow curls x3 ways (sup/neut/pron)  2x10 ea BTB           Tricep press down x3 ways (sup/neut/pron)  2x10 ea BTB           Tricep press down w/ 5 sec eccentric  x10 ea x2 BTB           Prayer stretch for triceps  20 sec x5                                                                                                                                                                       Assessment IE            Education HEP, POC UHEP               Modalities

## 2019-11-25 ENCOUNTER — OFFICE VISIT (OUTPATIENT)
Dept: PHYSICAL THERAPY | Facility: CLINIC | Age: 19
End: 2019-11-25
Payer: COMMERCIAL

## 2019-11-25 DIAGNOSIS — M77.8 TRICEPS TENDINITIS: Primary | ICD-10-CM

## 2019-11-25 PROCEDURE — 97112 NEUROMUSCULAR REEDUCATION: CPT | Performed by: PHYSICAL MEDICINE & REHABILITATION

## 2019-11-25 PROCEDURE — 97140 MANUAL THERAPY 1/> REGIONS: CPT | Performed by: PHYSICAL MEDICINE & REHABILITATION

## 2019-11-25 NOTE — PROGRESS NOTES
Daily Note     Today's date: 2019  Patient name: Landon Clark  : 2000  MRN: [id number]  Referring provider: Armand Zayas MD  Dx:   Encounter Diagnosis     ICD-10-CM    1  Triceps tendinitis M77 9                   Subjective: Patient offers no new complaints, notes no significant change in symptoms  Objective: See treatment diary below      Assessment: The patient tolerated manual and active treatment well today  Objective weakness with eccentric phase during tricep push downs with TB  No stretch felt with passive elbow extension stretch today, held  Slight laxity noted on L vs  R during proximal radioulnar mobs  Patient would benefit from continued skilled intervention with focus on eccentrics  Plan: Continue per plan of care        Daily Treatment Diary     Manual            Humeroulnar scoop (gr II-III)  5 min LH          Radiohumeral distract (gr II-III)  5 min LH          Prox radioulnar (gr II-III)  5 min LH                                        Exercise Diary            Wall push-up             Elbow flex/ext stretch  20 sec x5 ea elbow flex w/ shld flex, 3x20"          Elbow curls x3 ways (sup/neut/pron)  2x10 ea BTB 2x10 ea, BTB          Tricep press down x3 ways (sup/neut/pron)  2x10 ea BTB 2x10 ea BTB          Tricep press down w/ 5 sec eccentric  x10 ea x2 BTB 2x10 ea, BTB          Prayer stretch for triceps  20 sec x5 5x20"                                                                                                                                                                      Assessment IE            Education HEP, POC UHEP               Modalities

## 2019-12-02 ENCOUNTER — OFFICE VISIT (OUTPATIENT)
Dept: PHYSICAL THERAPY | Facility: CLINIC | Age: 19
End: 2019-12-02
Payer: COMMERCIAL

## 2019-12-02 DIAGNOSIS — M77.8 TRICEPS TENDINITIS: Primary | ICD-10-CM

## 2019-12-02 PROCEDURE — 97110 THERAPEUTIC EXERCISES: CPT | Performed by: PHYSICAL THERAPIST

## 2019-12-02 PROCEDURE — 97140 MANUAL THERAPY 1/> REGIONS: CPT | Performed by: PHYSICAL THERAPIST

## 2019-12-02 PROCEDURE — 97112 NEUROMUSCULAR REEDUCATION: CPT | Performed by: PHYSICAL THERAPIST

## 2019-12-02 NOTE — PROGRESS NOTES
Daily Note     Today's date: 2019  Patient name: Esau Nur  : 2000  MRN: [id number]  Referring provider: Shelli Jackson MD  Dx:   Encounter Diagnosis     ICD-10-CM    1  Triceps tendinitis M77 9        Start Time: 802  Stop Time:   Total time in clinic (min): 40 minutes    Subjective: No new complaints today  The patient reports improvement in symptoms since previous session  Objective: See treatment diary below      Assessment: The patient tolerated manual and active treatment well today  The PT continued to progress elbow and shoulder girdle strengthening to promote improved overall tolerance for weightbearing and general symptom decrease during today's treatment  The patient demonstrated good response to today's treatment  Plan: Continue per plan of care        Daily Treatment Diary     Manual           Humeroulnar scoop (gr II-III)  5 min LH SRB         Radiohumeral distract (gr II-III)  5 min LH SRB         Prox radioulnar (gr II-III)  5 min LH SRB                                       Exercise Diary           Wall push-up             Elbow flex/ext stretch  20 sec x5 ea elbow flex w/ shld flex, 3x20"          Elbow curls x3 ways (sup/neut/pron)  2x10 ea BTB 2x10 ea, BTB 5 sec ecc x25 BlaTB         Tricep press down x3 ways (sup/neut/pron)  2x10 ea BTB 2x10 ea BTB          Tricep press down w/ 5 sec eccentric  x10 ea x2 BTB 2x10 ea, BTB 3x10 @ [facility]         Prayer stretch for triceps  20 sec x5 5x20"          Tricep overhead ext    3x10 @ [place]         Seated sup-pron w/ dumbbell    5# x25 ea                                                                                                                                           Assessment IE            Education HEP, POC UHEP               Modalities

## 2019-12-08 NOTE — PROGRESS NOTES
Daily Note     Today's date: 2019  Patient name: Cristal Faustin  : 2000  MRN: [id number]  Referring provider: Tyler Mclean MD  Dx:   Encounter Diagnosis     ICD-10-CM    1  Triceps tendinitis M77 9        Start Time:   Stop Time: 154  Total time in clinic (min): 40 minutes    Subjective: No new complaints today  The patient reports improvement in symptoms since previous session  Objective: See treatment diary below      Assessment: The patient tolerated manual and active treatment well today  The PT continued to progress elbow and shoulder girdle strengthening to promote improved overall tolerance for weightbearing and general symptom decrease during today's treatment  Emphasis on OKC loading  The patient demonstrated good response to today's treatment  Evaluation of  strength continues to reveal significant weakness on the R UE, but did not respond positively to any MWM techniques for joint repositioning  Assessment of weakness and soreness indicates decreased likelihood of neuropathic symptoms as well, indicating strength and mobility treatments continue to be the most appropriate interventions  Plan: Continue per plan of care        Daily Treatment Diary     Manual          Humeroulnar scoop (gr II-III)  5 min LH SRB         Radiohumeral distract (gr II-III)  5 min LH SRB         Prox radioulnar (gr II-III)  5 min LH SRB                                       Exercise Diary          Standing UBE     iso x3 min ea        Wall push-up             Elbow flex/ext stretch  20 sec x5 ea elbow flex w/ shld flex, 3x20"          Elbow curls x3 ways (sup/neut/pron)  2x10 ea BTB 2x10 ea, BTB 5 sec ecc x25 BlaTB         Tricep press down x3 ways (sup/neut/pron)  2x10 ea BTB 2x10 ea BTB          Tricep press down w/ 5 sec eccentric  x10 ea x2 BTB 2x10 ea, BTB 3x10 @ Bib 2x10 w/ 5 sec ecc BlaTB        Prayer stretch for triceps 20 sec x5 5x20"          Tricep overhead ext    3x10 @ [place]         Seated sup-pron w/ dumbbell    5# x25 ea 5# x25 ea        Wrist flex/ext ecc w/ therabar     x25 ea bilat              Indiv finger flex 2x10 ea red                                                                                                                Assessment IE    x10 min, FOTO        Education HEP, POC UHEP   UHEP            Modalities

## 2019-12-09 ENCOUNTER — OFFICE VISIT (OUTPATIENT)
Dept: PHYSICAL THERAPY | Facility: CLINIC | Age: 19
End: 2019-12-09
Payer: COMMERCIAL

## 2019-12-09 DIAGNOSIS — M77.8 TRICEPS TENDINITIS: Primary | ICD-10-CM

## 2019-12-09 PROCEDURE — 97535 SELF CARE MNGMENT TRAINING: CPT | Performed by: PHYSICAL THERAPIST

## 2019-12-09 PROCEDURE — 97112 NEUROMUSCULAR REEDUCATION: CPT | Performed by: PHYSICAL THERAPIST

## 2019-12-09 PROCEDURE — 97110 THERAPEUTIC EXERCISES: CPT | Performed by: PHYSICAL THERAPIST

## 2019-12-16 ENCOUNTER — OFFICE VISIT (OUTPATIENT)
Dept: PHYSICAL THERAPY | Facility: CLINIC | Age: 19
End: 2019-12-16
Payer: COMMERCIAL

## 2019-12-16 DIAGNOSIS — M77.8 TRICEPS TENDINITIS: Primary | ICD-10-CM

## 2019-12-16 PROCEDURE — 97110 THERAPEUTIC EXERCISES: CPT | Performed by: PHYSICAL THERAPIST

## 2019-12-16 PROCEDURE — 97140 MANUAL THERAPY 1/> REGIONS: CPT | Performed by: PHYSICAL THERAPIST

## 2019-12-16 PROCEDURE — 97112 NEUROMUSCULAR REEDUCATION: CPT | Performed by: PHYSICAL THERAPIST

## 2019-12-16 NOTE — PROGRESS NOTES
Daily Note     Today's date: 2019  Patient name: Brook Collins  : 2000  MRN: [id number]  Referring provider: Talib Mosher MD  Dx:   Encounter Diagnosis     ICD-10-CM    1  Triceps tendinitis M77 9        Start Time:   Stop Time:   Total time in clinic (min): 40 minutes    Subjective: No new complaints today  The patient reports improvement in symptoms since previous session, particularly notes of less popping in his elbows since starting resisted supination/pronation exercises        Objective: See treatment diary below      Assessment: The patient tolerated manual and active treatment well today  The PT continued to progress elbow and shoulder girdle strengthening to promote improved overall tolerance for weightbearing and general symptom decrease during today's treatment  Emphasis on CKC loading  The patient demonstrated good response to today's treatment  Plan: Continue per plan of care        Daily Treatment Diary     Manual         Humeroulnar scoop (gr II-III)  5 min LH SRB  SRB       Radiohumeral distract (gr II-III)  5 min LH SRB  SRB       Prox radioulnar (gr II-III)  5 min LH SRB  SRB                                     Exercise Diary         Standing UBE     iso x3 min ea iso x3 min ea       Wall push-up             Elbow flex/ext stretch  20 sec x5 ea elbow flex w/ shld flex, 3x20"          Elbow curls x3 ways (sup/neut/pron)  2x10 ea BTB 2x10 ea, BTB 5 sec ecc x25 BlaTB         Tricep press down x3 ways (sup/neut/pron)  2x10 ea BTB 2x10 ea BTB          Tricep press down w/ 5 sec eccentric  x10 ea x2 BTB 2x10 ea, BTB 3x10 @ [place] 2x10 w/ 5 sec ecc BlaTB 3x15 @ [place]       Prayer stretch for triceps  20 sec x5 5x20"          Tricep overhead ext    3x10 @ [facility]         Seated sup-pron w/ dumbbell    5# x25 ea 5# x25 ea 5# x25 ea       Wrist flex/ext ecc w/ therabar     x25 ea bilat        Sup/pron w/ therabar      x25 ea bilat             Indiv finger flex 2x10 ea red        Theraweb sup/pron      2x10 ea       Chair tricep dips w/ sup       X10, 5 sec hold                                                                                     Assessment IE    x10 min, FOTO        Education HEP, POC UHEP   UHEP            Modalities

## 2019-12-26 ENCOUNTER — OFFICE VISIT (OUTPATIENT)
Dept: PHYSICAL THERAPY | Facility: CLINIC | Age: 19
End: 2019-12-26
Payer: COMMERCIAL

## 2019-12-26 DIAGNOSIS — M77.8 TRICEPS TENDINITIS: Primary | ICD-10-CM

## 2019-12-26 PROCEDURE — 97110 THERAPEUTIC EXERCISES: CPT | Performed by: PHYSICAL THERAPIST

## 2019-12-26 PROCEDURE — 97140 MANUAL THERAPY 1/> REGIONS: CPT | Performed by: PHYSICAL THERAPIST

## 2019-12-26 NOTE — PROGRESS NOTES
Daily Note     Today's date: 2019  Patient name: Cristal Faustin  : 2000  MRN: [id number]  Referring provider: Tyler Mclean MD  Dx:   Encounter Diagnosis     ICD-10-CM    1  Triceps tendinitis M77 9        Start Time:   Stop Time:   Total time in clinic (min): 45 minutes    Subjective: No new complaints today  The patient reports improvement in symptoms since previous session, particularly notes of less popping in his elbows since starting resisted supination/pronation exercises        Objective: See treatment diary below      Assessment: The patient tolerated manual and active treatment well today  The PT continued to emphasize active intervention due to recent reports of decreased symptom since incorporating these interventions  The patient demonstrated good response to today's treatment  Plan: Continue per plan of care        Daily Treatment Diary     Manual        Humeroulnar scoop (gr II-III)  5 min LH SRB  SRB SRB      Radiohumeral distract (gr II-III)  5 min LH SRB  SRB SRB      Prox radioulnar (gr II-III)  5 min LH SRB  SRB SRB                                    Exercise Diary        Standing UBE     iso x3 min ea iso x3 min ea iso x3 min ea      Wall push-up             Elbow flex/ext stretch  20 sec x5 ea elbow flex w/ shld flex, 3x20"          Elbow curls x3 ways (sup/neut/pron)  2x10 ea BTB 2x10 ea, BTB 5 sec ecc x25 BlaTB         Tricep press down x3 ways (sup/neut/pron)  2x10 ea BTB 2x10 ea BTB          Tricep press down w/ 5 sec eccentric  x10 ea x2 BTB 2x10 ea, BTB 3x10 @ [facility] 2x10 w/ 5 sec ecc BlaTB 3x15 @ [facility] 3x15 @ [facility]      Prayer stretch for triceps  20 sec x5 5x20"          Tricep overhead ext    3x10 @ [facility]         Seated sup-pron w/ dumbbell    5# x25 ea 5# x25 ea 5# x25 ea       Wrist flex/ext ecc w/ therabar     Deliliah Inches ea bilat        Sup/pron w/ therabar      x25 ea bilat x25 ea bilat            Indiv finger flex 2x10 ea red        Theraweb sup/pron      2x10 ea 2x10 ea      Chair tricep dips w/ sup       X10, 5 sec hold X10, 5 sec hold      Curl + overhead press @ [place]       3x10 ea bilat                                                                       Assessment IE    x10 min, FOTO        Education HEP, POC UHEP   UHEP            Modalities

## 2019-12-31 ENCOUNTER — EVALUATION (OUTPATIENT)
Dept: PHYSICAL THERAPY | Facility: CLINIC | Age: 19
End: 2019-12-31
Payer: COMMERCIAL

## 2019-12-31 DIAGNOSIS — M77.8 TRICEPS TENDINITIS: Primary | ICD-10-CM

## 2019-12-31 PROCEDURE — 97164 PT RE-EVAL EST PLAN CARE: CPT | Performed by: PHYSICAL THERAPIST

## 2019-12-31 PROCEDURE — 97535 SELF CARE MNGMENT TRAINING: CPT | Performed by: PHYSICAL THERAPIST

## 2019-12-31 PROCEDURE — 97110 THERAPEUTIC EXERCISES: CPT | Performed by: PHYSICAL THERAPIST

## 2019-12-31 NOTE — PROGRESS NOTES
PT Re-Evaluation    Today's date: 2019  Patient name: Korin Torres  : 2000  MRN: [id number]  Referring provider: Edwige Gresham MD  Dx:   Encounter Diagnosis     ICD-10-CM    1  Triceps tendinitis M77 9        Start Time:   Stop Time: 945  Total time in clinic (min): 41 minutes    Subjective: No new complaints today  The patient reports some change in symptoms since previous session  The patient reports 2/10 pain at it's worst over the past 24 hours, and reports 20% improvement in overall condition since beginning formal PT care  Objective: See treatment diary below    Passive Range of Motion   Left Elbow   Flexion: 150 degrees   Extension: 5 degrees   Forearm supination: 80 degrees   Forearm pronation: 80 degrees     Right Elbow   Flexion: 146 degrees   Extension: 0 degrees   Forearm supination: 75 degrees   Forearm pronation: 80 degrees     Joint Play   Left Elbow   Mobility WNL in the humeroulnar joint, humeroradial joint, proximal radioulnar joint and distal radioulnar joint  Right Elbow   Mobility WNL in the humeroulnar joint, humeroradial joint, proximal radioulnar joint and distal radioulnar joint  Strength/Myotome Testing   Left Elbow   Flexion: 5  Extension: 5  Forearm supination: 5  Forearm pronation: 5     Right Elbow   Flexion: 5  Extension: 5  Forearm supination: 5  Forearm pronation: 5     Left Wrist/Hand    (2nd hand position)   Trial 1: 60  Trial 2: 56  Trial 3: 45     Right Wrist/Hand    (2nd hand position)   Trial 1: 58  Trial 2: 50  Trial 3: 42     Tests   Left Elbow   Negative chair sign, elbow flexion, moving valgus stress, valgus stress at 30 degrees and varus stress at 30 degrees  Right Elbow   Negative chair sign, elbow flexion, moving valgus stress, valgus stress at 30 degrees and varus stress at 30 degrees  Assessment: Korin Torres is a pleasant 20 y o  male who has been receiving PT intervention for bilateral elbow pain   The patient has demonstrated increased strength, increased ROM, increased joint mobility and increased activity tolerance since beginning treatment  Current chief complaint is Soreness with weight-bearing exercises through his elbows  Primary Remaining Impairments:  1)  Pain with resisted elbow extension  2)   strength has declined since initial evaluation    Based on examination, the majority of objective measurements have progressed appropriately, however there is a discordant decline in subjective report  The patient should continue with home exercises with occasional check-ins to formal PT to attempt to increase exposure to total amount of resistive training  Goals   Patient will be independent with home exercise program    Patient will be able to manage symptoms independently  STG: to be achieved in 4-6 weeks  1)Reduce pain by 50% in bilateral elbows (MET)  2)Improve pain free  strength by symmetrical bilaterally (NOT MET)  3)Improve flexibility to wrist muscles to WNL (MET)    LTG: to be achieved at discharge   1)Improve FOTO to greater than or equal to 75 (NOT MET, regressing)  2)Improve ADL's in related activities to maximal level of function (MET)  3)Improve  strength to pain free with maximal force (NOT MET)    Plan   Patient would benefit from: skilled physical therapy   Planned modality interventions: Modalities PRN    Planned therapy interventions: activity modification, manual therapy, neuromuscular re-education, patient education, therapeutic activities, therapeutic exercise, graded activity, home exercise program, behavior modification and self care  Frequency: 1x week  Duration in weeks: 6  Treatment plan discussed with: patient       Daily Treatment Diary     Manual  11/13 11/18 11/25 12/2 12/9 12/16 12/26 12/31     Humeroulnar scoop (gr II-III)  5 min LH SRB  SRB SRB      Radiohumeral distract (gr II-III)  5 min LH SRB  SRB SRB      Prox radioulnar (gr II-III)  5 min LH SRB  SRB SRB                                    Exercise Diary  11/13 11/18 11/25 12/2 12/9 12/16 12/26 12/31     Standing UBE     iso x3 min ea iso x3 min ea iso x3 min ea      Wall push-up             Elbow flex/ext stretch  20 sec x5 ea elbow flex w/ shld flex, 3x20"          Elbow curls x3 ways (sup/neut/pron)  2x10 ea BTB 2x10 ea, BTB 5 sec ecc x25 BlaTB         Tricep press down x3 ways (sup/neut/pron)  2x10 ea BTB 2x10 ea BTB          Tricep press down w/ 5 sec eccentric  x10 ea x2 BTB 2x10 ea, BTB 3x10 @ [place] 2x10 w/ 5 sec ecc BlaTB 3x15 @ [place] 3x15 @ [place]      Prayer stretch for triceps  20 sec x5 5x20"          Tricep overhead ext    3x10 @ [facility]         Seated sup-pron w/ dumbbell    5# x25 ea 5# x25 ea 5# x25 ea       Wrist flex/ext ecc w/ therabar     x25 ea bilat        Sup/pron w/ therabar      x25 ea bilat x25 ea bilat            Indiv finger flex 2x10 ea red        Theraweb sup/pron      2x10 ea 2x10 ea      Chair tricep dips w/ sup       X10, 5 sec hold X10, 5 sec hold      Curl + overhead press @ [facility]       3x10 ea bilat                                                                       Assessment IE    x10 min, FOTO   RE     Education HEP, POC UHEP   Ottis Cordial, UPOC         Modalities

## 2020-01-13 ENCOUNTER — OFFICE VISIT (OUTPATIENT)
Dept: PHYSICAL THERAPY | Facility: CLINIC | Age: 20
End: 2020-01-13
Payer: COMMERCIAL

## 2020-01-13 DIAGNOSIS — M77.8 TRICEPS TENDINITIS: Primary | ICD-10-CM

## 2020-01-13 PROCEDURE — 97140 MANUAL THERAPY 1/> REGIONS: CPT | Performed by: PHYSICAL THERAPIST

## 2020-01-13 PROCEDURE — 97112 NEUROMUSCULAR REEDUCATION: CPT | Performed by: PHYSICAL THERAPIST

## 2020-01-13 PROCEDURE — 97110 THERAPEUTIC EXERCISES: CPT | Performed by: PHYSICAL THERAPIST

## 2020-01-13 NOTE — PROGRESS NOTES
Daily Note     Today's date: 2020  Patient name: Musa Sky  : 2000  MRN: [id number]  Referring provider: Carley Tapia MD  Dx: No diagnosis found  Subjective: No new complaints today  The patient reports some increase in popping/snapping symptoms with active supination since previous session  Objective: See treatment diary below      Assessment: The patient tolerated manual and active treatment well today  The PT emphasized strengthening for both  and CKC strengthening during today's treatment to promote improved tolerance to activity  The patient demonstrated good response to today's treatment  Plan: Continue per plan of care        Daily Treatment Diary     Manual      Humeroulnar scoop (gr II-III)  5 min LH SRB  SRB SRB  SRB    Radiohumeral distract (gr II-III)  5 min LH SRB  SRB SRB  SRB    Prox radioulnar (gr II-III)  5 min LH SRB  SRB SRB      STM lateral elbow         SRB                     Exercise Diary      Standing UBE     iso x3 min ea iso x3 min ea iso x3 min ea  x3 min ea    Wall push-up         Incline bench 3x10    Elbow flex/ext stretch  20 sec x5 ea elbow flex w/ shld flex, 3x20"          Elbow curls x3 ways (sup/neut/pron)  2x10 ea BTB 2x10 ea, BTB 5 sec ecc x25 BlaTB     Incline curls 15# 3x10    Tricep press down x3 ways (sup/neut/pron)  2x10 ea BTB 2x10 ea BTB      Row+press @ [place] 25# 3x10    Tricep press down w/ 5 sec eccentric  x10 ea x2 BTB 2x10 ea, BTB 3x10 @ [place] 2x10 w/ 5 sec ecc BlaTB 3x15 @ [facility] 3x15 @ [place]      Prayer stretch for triceps  20 sec x5 5x20"          Tricep overhead ext    3x10 @ [place]         Seated sup-pron w/ dumbbell    5# x25 ea 5# x25 ea 5# x25 ea 5# x25 ea  Seated shoulder press 15# 3x10    Wrist flex/ext ecc w/ therabar     x25 ea bilat        Sup/pron w/ therabar      x25 ea bilat x25 ea bilat       Indiv finger flex 2x10 ea red        Theraweb sup/pron      2x10 ea 2x10 ea      Chair tricep dips w/ sup       X10, 5 sec hold X10, 5 sec hold      Curl + overhead press @ [place]       3x10 ea bilat      Incline chest press         15# 3x10                                                        Assessment IE    x10 min, FOTO   RE     Education HEP, POC UHEP   [place], UPOC         Modalities

## 2020-01-19 NOTE — PROGRESS NOTES
Daily Note     Today's date: 2020  Patient name: Brianna Adan  : 2000  MRN: [id number]  Referring provider: Eliezer Fleming MD  Dx:   Encounter Diagnosis     ICD-10-CM    1  Triceps tendinitis M77 9        Start Time:   Stop Time: 520  Total time in clinic (min): 45 minutes    Subjective: No new complaints today  The patient reports some increase in popping/snapping symptoms with active supination since previous session  Objective: See treatment diary below      Assessment: The patient tolerated manual and active treatment well today  The PT emphasized strengthening for both  and CKC strengthening during today's treatment to promote improved tolerance to activity  The patient demonstrated good response to today's treatment  Plan: Continue per plan of care        Daily Treatment Diary     Manual     Humeroulnar scoop (gr II-III)  5 min LH SRB  SRB SRB  SRB SRB   Radiohumeral distract (gr II-III)  5 min LH SRB  SRB SRB  SRB SRB   Prox radioulnar (gr II-III)  5 min LH SRB  SRB SRB      STM lateral elbow         SRB SRB                    Exercise Diary     Standing UBE     iso x3 min ea iso x3 min ea iso x3 min ea  x3 min ea x5 min fw   Wall push-up         Incline bench 3x10 Incline bench 3x10   Elbow flex/ext stretch  20 sec x5 ea elbow flex w/ shld flex, 3x20"          Elbow curls x3 ways (sup/neut/pron)  2x10 ea BTB 2x10 ea, BTB 5 sec ecc x25 BlaTB     Incline curls 15# 3x10 Incline curls 15# 3x10   Tricep press down x3 ways (sup/neut/pron)  2x10 ea BTB 2x10 ea BTB      Row+press @ [place] 25# 3x10 Row+press @ [place] 25# 3x10   Tricep press down w/ 5 sec eccentric  x10 ea x2 BTB 2x10 ea, BTB 3x10 @ [place] 2x10 w/ 5 sec ecc BlaTB 3x15 @ [facility] 3x15 @ [facility]      Prayer stretch for triceps  20 sec x5 5x20"          Tricep overhead ext    3x10 @ [place]         Seated sup-pron w/ dumbbell    5# x25 ea 5# x25 ea 5# x25 ea 5# x25 ea  Seated shoulder press 15# 3x10 Seated shoulder press 15# 3x10   Wrist flex/ext ecc w/ therabar     x25 ea bilat        Sup/pron w/ therabar      x25 ea bilat x25 ea bilat            Indiv finger flex 2x10 ea red        Theraweb sup/pron      2x10 ea 2x10 ea      Chair tricep dips w/ sup       X10, 5 sec hold X10, 5 sec hold      Curl + overhead press @ [place]       3x10 ea bilat      Incline chest press         15# 3x10 15# 3x10   Farmer's carry          LASHANDA 30# x3 laps ea                                          Assessment IE    x10 min, FOTO   RE     Education HEP, POC UHEP   Lisabeth Gess, UPOC         Modalities

## 2020-01-20 ENCOUNTER — OFFICE VISIT (OUTPATIENT)
Dept: PHYSICAL THERAPY | Facility: CLINIC | Age: 20
End: 2020-01-20
Payer: COMMERCIAL

## 2020-01-20 DIAGNOSIS — M77.8 TRICEPS TENDINITIS: Primary | ICD-10-CM

## 2020-01-20 PROCEDURE — 97110 THERAPEUTIC EXERCISES: CPT | Performed by: PHYSICAL THERAPIST

## 2020-01-20 PROCEDURE — 97140 MANUAL THERAPY 1/> REGIONS: CPT | Performed by: PHYSICAL THERAPIST

## 2020-01-20 PROCEDURE — 97112 NEUROMUSCULAR REEDUCATION: CPT | Performed by: PHYSICAL THERAPIST

## 2020-01-27 ENCOUNTER — OFFICE VISIT (OUTPATIENT)
Dept: PHYSICAL THERAPY | Facility: CLINIC | Age: 20
End: 2020-01-27
Payer: COMMERCIAL

## 2020-01-27 DIAGNOSIS — M77.8 TRICEPS TENDINITIS: Primary | ICD-10-CM

## 2020-01-27 PROCEDURE — 97140 MANUAL THERAPY 1/> REGIONS: CPT | Performed by: PHYSICAL THERAPIST

## 2020-01-27 PROCEDURE — 97112 NEUROMUSCULAR REEDUCATION: CPT | Performed by: PHYSICAL THERAPIST

## 2020-01-27 PROCEDURE — 97110 THERAPEUTIC EXERCISES: CPT | Performed by: PHYSICAL THERAPIST

## 2020-01-27 NOTE — PROGRESS NOTES
Daily Note     Today's date: 2020  Patient name: Lucy Gonzalez  : 2000  MRN: [id number]  Referring provider: Eloisa Pham MD  Dx:   Encounter Diagnosis     ICD-10-CM    1  Triceps tendinitis M77 9        Start Time:   Stop Time:   Total time in clinic (min): 40 minutes    Subjective: No new complaints today  The patient reports some increase in popping/snapping symptoms with active supination since previous session  Objective: See treatment diary below      Assessment: The patient tolerated manual and active treatment well today  The PT emphasized strengthening for both  and CKC UE activities during today's treatment to promote improved tolerance to activity  The patient demonstrated good response to today's treatment  Plan: Continue per plan of care        Daily Treatment Diary     Manual     Humeroulnar scoop (gr II-III) SRB     SRB SRB  SRB SRB   Radiohumeral distract (gr II-III) SRB     SRB SRB  SRB SRB   Prox radioulnar (gr II-III) SRB     SRB SRB      STM lateral elbow SRB        SRB SRB                    Exercise Diary     Standing UBE x3 min     iso x3 min ea iso x3 min ea  x3 min ea x5 min fw   Wall push-up         Incline bench 3x10 Incline bench 3x10   Elbow flex/ext stretch             Elbow curls x3 ways (sup/neut/pron)         Incline curls 15# 3x10 Incline curls 15# 3x10   Tricep press down x3 ways (sup/neut/pron) Single UE press 15# 3x10        Row+press @ [place] 25# 3x10 Row+press @ [place] 25# 3x10   Tricep press down w/ 5 sec eccentric      3x15 @ [place] 3x15 @ [facility]      Prayer stretch for triceps             Tricep overhead ext             Seated sup-pron w/ dumbbell Seated shoulder press 15# 3x10     5# x25 ea 5# x25 ea  Seated shoulder press 15# 3x10 Seated shoulder press 15# 3x10   Wrist flex/ext ecc w/ therabar             Sup/pron w/ therabar      x25 ea bilat x25 ea bilat               Theraweb sup/pron      2x10 ea 2x10 ea      Chair tricep dips w/ sup       X10, 5 sec hold X10, 5 sec hold      Curl + overhead press @ [place]       3x10 ea bilat      Incline chest press 15# 3x10        15# 3x10 15# 3x10   Farmer's carry LASHANDA 30# x3 laps ea         LASHANDA 30# x3 laps ea   Incline flyes 10# 3x10 ea            Prone Y/T/I 3# 3x10 ea                         Assessment        RE     Education        [place], [place]         Modalities

## 2020-02-09 NOTE — PROGRESS NOTES
Progress Note     Today's date: 2/10/2020  Patient name: Anais Gray  : 2000  MRN: [id number]  Referring provider: Darien Kingsley MD  Dx:   Encounter Diagnosis     ICD-10-CM    1  Triceps tendinitis M77 9        Start Time: 63  Stop Time:   Total time in clinic (min): 45 minutes    Subjective: No new complaints today  The patient reports some increase in popping/snapping symptoms with active supination since previous session  Objective: See treatment diary below      Assessment: Anais Gray is a pleasant 20 y o  male who has been receiving PT intervention for bilateral elbow pain with popping/clicking  The patient has demonstrated increased strength, increased joint mobility, improved body mechanics and increased activity tolerance since beginning treatment  Current chief complaint is pain during loaded elbow extension  The PT assessment indicates that although the patient has not met the appropriate level of tolerance for all necessarry activity, there is nothing in the patient's current presentation indicating that he should be unable to continue to progress his current level of tolerance for activity through an independent exercise program  No concerns exist for further injury or tissue pathology  Goals   Patient will be independent with home exercise program    Patient will be able to manage symptoms independently  STG: to be achieved in 4-6 weeks  1)Reduce pain by 50% in bilateral elbows (MET)  2)Improve pain free  strength by symmetrical bilaterally (NOT MET)  3)Improve flexibility to wrist muscles to WNL (MET)    LTG: to be achieved at discharge   1)Improve FOTO to greater than or equal to 75 (MET)  2)Improve ADL's in related activities to maximal level of function (MET)  3)Improve  strength to pain free with maximal force (NOT MET)    Plan: Today will be the patient's last session in his current plan of care due to plateau of symptoms   The patient will continue with a home program to emphasize increased tolerance to recreational and fitness activity        Daily Treatment Diary     Manual  1/27 2/10    12/16 12/26 12/31 1/13 1/20   Humeroulnar scoop (gr II-III) SRB SRB    SRB SRB  SRB SRB   Radiohumeral distract (gr II-III) SRB SRB    SRB SRB  SRB SRB   Prox radioulnar (gr II-III) SRB SRB    SRB SRB      STM lateral elbow SRB SRB       SRB SRB                    Exercise Diary  1/27 2/10    12/16 12/26 12/31 1/13 1/20   Standing UBE x3 min x3 min    iso x3 min ea iso x3 min ea  x3 min ea x5 min fw   Wall push-up         Incline bench 3x10 Incline bench 3x10   Elbow flex/ext stretch             Elbow curls x3 ways (sup/neut/pron)         Incline curls 15# 3x10 Incline curls 15# 3x10   Tricep press down x3 ways (sup/neut/pron) Single UE press 15# 3x10 Single UE press 15# 3x10       Row+press @ [place] 25# 3x10 Row+press @ [place] 25# 3x10   Tricep press down w/ 5 sec eccentric      3x15 @ [place] 3x15 @ [facility]      Prayer stretch for triceps             Tricep overhead ext             Seated sup-pron w/ dumbbell Seated shoulder press 15# 3x10 Seated shoulder press 15# 3x10    5# x25 ea 5# x25 ea  Seated shoulder press 15# 3x10 Seated shoulder press 15# 3x10   Wrist flex/ext ecc w/ therabar             Sup/pron w/ therabar      x25 ea bilat x25 ea bilat                    Theraweb sup/pron      2x10 ea 2x10 ea      Chair tricep dips w/ sup       X10, 5 sec hold X10, 5 sec hold      Curl + overhead press @ [place]       3x10 ea bilat      Incline chest press 15# 3x10 15# 3x10       15# 3x10 15# 3x10   Farmer's carry LASHANDA 30# x3 laps ea LASHANDA 30# x3 laps ea        LASHANDA 30# x3 laps ea   Incline flyes 10# 3x10 ea 10# 3x10 ea           Prone Y/T/I 3# 3x10 ea 3# 3x10 ea                        Assessment  FOTO      RE     Education  [place], [place] planning      J[place], UPOC         Modalities

## 2020-02-10 ENCOUNTER — OFFICE VISIT (OUTPATIENT)
Dept: PHYSICAL THERAPY | Facility: CLINIC | Age: 20
End: 2020-02-10
Payer: COMMERCIAL

## 2020-02-10 DIAGNOSIS — M77.8 TRICEPS TENDINITIS: Primary | ICD-10-CM

## 2020-02-10 PROCEDURE — 97110 THERAPEUTIC EXERCISES: CPT | Performed by: PHYSICAL THERAPIST

## 2020-02-10 PROCEDURE — 97140 MANUAL THERAPY 1/> REGIONS: CPT | Performed by: PHYSICAL THERAPIST

## 2020-02-18 ENCOUNTER — TELEPHONE (OUTPATIENT)
Dept: FAMILY MEDICINE CLINIC | Facility: CLINIC | Age: 20
End: 2020-02-18

## 2020-02-18 NOTE — TELEPHONE ENCOUNTER
Patient called stating he would like to be seen by you before the 28th to follow up on his joints  He can not come in on fridays

## 2020-02-25 ENCOUNTER — OFFICE VISIT (OUTPATIENT)
Dept: FAMILY MEDICINE CLINIC | Facility: CLINIC | Age: 20
End: 2020-02-25
Payer: COMMERCIAL

## 2020-02-25 VITALS
WEIGHT: 155.8 LBS | SYSTOLIC BLOOD PRESSURE: 112 MMHG | TEMPERATURE: 98 F | HEART RATE: 62 BPM | BODY MASS INDEX: 25.04 KG/M2 | HEIGHT: 66 IN | DIASTOLIC BLOOD PRESSURE: 70 MMHG | RESPIRATION RATE: 16 BRPM

## 2020-02-25 DIAGNOSIS — M25.522 PAIN OF BOTH ELBOWS: ICD-10-CM

## 2020-02-25 DIAGNOSIS — M25.521 PAIN OF BOTH ELBOWS: ICD-10-CM

## 2020-02-25 DIAGNOSIS — M24.829 ELBOW LOCKING: Primary | ICD-10-CM

## 2020-02-25 DIAGNOSIS — R19.5 POSITIVE FECAL OCCULT BLOOD TEST: ICD-10-CM

## 2020-02-25 PROCEDURE — 99214 OFFICE O/P EST MOD 30 MIN: CPT | Performed by: FAMILY MEDICINE

## 2020-02-25 PROCEDURE — 3008F BODY MASS INDEX DOCD: CPT | Performed by: FAMILY MEDICINE

## 2020-02-25 PROCEDURE — 1036F TOBACCO NON-USER: CPT | Performed by: FAMILY MEDICINE

## 2020-02-25 NOTE — ASSESSMENT & PLAN NOTE
No weight change, melena/hematochezia or other symptoms  Check stool cards x 3, CBC, CMP and CRP    Recheck 1m

## 2020-02-25 NOTE — PROGRESS NOTES
Assessment/Plan:    Positive fecal occult blood test  No weight change, melena/hematochezia or other symptoms  Check stool cards x 3, CBC, CMP and CRP  Recheck 1m    Pain of both elbows  I reviewed with pt  Cont home exercises, otherwise watch  Letter for [facility] written    Elbow locking  Unchanged  Exam unremarkable  Ortho eval also without significant findings  Cont to monitor       Diagnoses and all orders for this visit:    Elbow locking    Pain of both elbows    Positive fecal occult blood test  -     CBC and differential; Future  -     Comprehensive metabolic panel; Future  -     C-reactive protein; Future          Subjective:      Patient ID: Laura Harrison is a 20 y o  male  F/u several issues  - pt still having bilateral elbow pain with occasional locking  Pain primarily remains the lateral aspect of the elbow but still can occur in the olecranon and joint itself  Patient is seen by ortho and has gone through a full course of physical therapy without improvement  He is in the [facility] is presently undergoing evaluation for profile versus medical discharge  Patient states that he has popping all the time even with minimal activity though olecranon area pain/triceps insertion pain does worsen with increased activity  There is no joint swelling noted  - patient recently saw a nutritionist to see if there was anything else he could do to help with this situation  Stool study done and reviewed  Of note, patient did have a mildly elevated fecal occult blood test   Patient denies any GI symptoms including abdominal pain, previous history of blood in the stool on the toilet paper, change in weight, constipation, or painful bowel movements  The following portions of the patient's history were reviewed and updated as appropriate:   He  has no past medical history on file    He   Patient Active Problem List    Diagnosis Date Noted    Positive fecal occult blood test 02/25/2020    Fasciculation 03/22/2019    Primary insomnia 03/22/2019    Arthralgia 05/63/8789    Elbow clicking 80/79/7704    Pain of both elbows 07/13/2018    Elbow locking 07/13/2018    Patellofemoral pain syndrome of both knees 05/16/2018     He  has no past surgical history on file  He  reports that he has never smoked  He has never used smokeless tobacco  His alcohol and drug histories are not on file  No current outpatient medications on file  No current facility-administered medications for this visit  He has No Known Allergies       Review of Systems   Constitutional: Negative  HENT: Negative  Eyes: Negative  Respiratory: Negative  Cardiovascular: Negative  Gastrointestinal: Negative  Endocrine: Negative  Genitourinary: Negative  Musculoskeletal: Positive for arthralgias  Negative for joint swelling and myalgias  Skin: Negative  Allergic/Immunologic: Negative  Neurological: Negative  Hematological: Negative  Psychiatric/Behavioral: Negative  Objective:      /70   Pulse 62   Temp 98 °F (36 7 °C)   Resp 16   Ht 5' 6" (1 676 m)   Wt 70 7 kg (155 lb 12 8 oz)   BMI 25 15 kg/m²          Physical Exam   Constitutional: He is oriented to person, place, and time  He appears well-developed and well-nourished  HENT:   Head: Normocephalic and atraumatic  Right Ear: External ear normal    Left Ear: External ear normal    Nose: Nose normal    Mouth/Throat: Oropharynx is clear and moist    Eyes: Pupils are equal, round, and reactive to light  Conjunctivae and EOM are normal    Neck: Normal range of motion  Neck supple  Cardiovascular: Normal rate, regular rhythm, normal heart sounds and intact distal pulses  No murmur heard  Pulmonary/Chest: Effort normal and breath sounds normal    Abdominal: Soft  Bowel sounds are normal  He exhibits no distension and no mass  There is no tenderness  Musculoskeletal: Normal range of motion   He exhibits no tenderness  Lymphadenopathy:     He has no cervical adenopathy  Neurological: He is alert and oriented to person, place, and time  He displays normal reflexes  No sensory deficit  He exhibits normal muscle tone  Skin: Skin is warm

## 2020-02-25 NOTE — LETTER
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

## 2020-02-26 ENCOUNTER — APPOINTMENT (OUTPATIENT)
Dept: LAB | Age: 20
End: 2020-02-26
Payer: COMMERCIAL

## 2020-02-26 DIAGNOSIS — R19.5 POSITIVE FECAL OCCULT BLOOD TEST: ICD-10-CM

## 2020-02-26 LAB
ALBUMIN SERPL BCP-MCNC: 4.6 G/DL (ref 3.5–5)
ALP SERPL-CCNC: 77 U/L (ref 46–116)
ALT SERPL W P-5'-P-CCNC: 32 U/L (ref 12–78)
ANION GAP SERPL CALCULATED.3IONS-SCNC: 5 MMOL/L (ref 4–13)
AST SERPL W P-5'-P-CCNC: 14 U/L (ref 5–45)
BASOPHILS # BLD AUTO: 0.03 THOUSANDS/ΜL (ref 0–0.1)
BASOPHILS NFR BLD AUTO: 1 % (ref 0–1)
BILIRUB SERPL-MCNC: 0.65 MG/DL (ref 0.2–1)
BUN SERPL-MCNC: 16 MG/DL (ref 5–25)
CALCIUM SERPL-MCNC: 9.6 MG/DL (ref 8.3–10.1)
CHLORIDE SERPL-SCNC: 105 MMOL/L (ref 100–108)
CO2 SERPL-SCNC: 29 MMOL/L (ref 21–32)
CREAT SERPL-MCNC: 0.84 MG/DL (ref 0.6–1.3)
CRP SERPL QL: <3 MG/L
EOSINOPHIL # BLD AUTO: 0.07 THOUSAND/ΜL (ref 0–0.61)
EOSINOPHIL NFR BLD AUTO: 2 % (ref 0–6)
ERYTHROCYTE [DISTWIDTH] IN BLOOD BY AUTOMATED COUNT: 11.9 % (ref 11.6–15.1)
GFR SERPL CREATININE-BSD FRML MDRD: 126 ML/MIN/1.73SQ M
GLUCOSE P FAST SERPL-MCNC: 85 MG/DL (ref 65–99)
HCT VFR BLD AUTO: 47.6 % (ref 36.5–49.3)
HGB BLD-MCNC: 16 G/DL (ref 12–17)
IMM GRANULOCYTES # BLD AUTO: 0.01 THOUSAND/UL (ref 0–0.2)
IMM GRANULOCYTES NFR BLD AUTO: 0 % (ref 0–2)
LYMPHOCYTES # BLD AUTO: 1.19 THOUSANDS/ΜL (ref 0.6–4.47)
LYMPHOCYTES NFR BLD AUTO: 25 % (ref 14–44)
MCH RBC QN AUTO: 31.1 PG (ref 26.8–34.3)
MCHC RBC AUTO-ENTMCNC: 33.6 G/DL (ref 31.4–37.4)
MCV RBC AUTO: 93 FL (ref 82–98)
MONOCYTES # BLD AUTO: 0.31 THOUSAND/ΜL (ref 0.17–1.22)
MONOCYTES NFR BLD AUTO: 7 % (ref 4–12)
NEUTROPHILS # BLD AUTO: 3.19 THOUSANDS/ΜL (ref 1.85–7.62)
NEUTS SEG NFR BLD AUTO: 65 % (ref 43–75)
NRBC BLD AUTO-RTO: 0 /100 WBCS
PLATELET # BLD AUTO: 255 THOUSANDS/UL (ref 149–390)
PMV BLD AUTO: 9.5 FL (ref 8.9–12.7)
POTASSIUM SERPL-SCNC: 4.2 MMOL/L (ref 3.5–5.3)
PROT SERPL-MCNC: 7.5 G/DL (ref 6.4–8.2)
RBC # BLD AUTO: 5.14 MILLION/UL (ref 3.88–5.62)
SODIUM SERPL-SCNC: 139 MMOL/L (ref 136–145)
WBC # BLD AUTO: 4.8 THOUSAND/UL (ref 4.31–10.16)

## 2020-02-26 PROCEDURE — 86140 C-REACTIVE PROTEIN: CPT

## 2020-02-26 PROCEDURE — 80053 COMPREHEN METABOLIC PANEL: CPT

## 2020-02-26 PROCEDURE — 85025 COMPLETE CBC W/AUTO DIFF WBC: CPT

## 2020-02-26 PROCEDURE — 36415 COLL VENOUS BLD VENIPUNCTURE: CPT

## 2020-03-02 ENCOUNTER — CLINICAL SUPPORT (OUTPATIENT)
Dept: FAMILY MEDICINE CLINIC | Facility: CLINIC | Age: 20
End: 2020-03-02
Payer: COMMERCIAL

## 2020-03-02 DIAGNOSIS — K92.1 MELENA: Primary | ICD-10-CM

## 2020-03-02 LAB — SL AMB POCT FECES OCC BLD: POSITIVE

## 2020-03-02 PROCEDURE — 82270 OCCULT BLOOD FECES: CPT | Performed by: FAMILY MEDICINE

## 2020-03-12 ENCOUNTER — OFFICE VISIT (OUTPATIENT)
Dept: GASTROENTEROLOGY | Facility: CLINIC | Age: 20
End: 2020-03-12
Payer: COMMERCIAL

## 2020-03-12 VITALS
HEIGHT: 66 IN | WEIGHT: 149 LBS | TEMPERATURE: 98 F | DIASTOLIC BLOOD PRESSURE: 53 MMHG | HEART RATE: 77 BPM | SYSTOLIC BLOOD PRESSURE: 94 MMHG | BODY MASS INDEX: 23.95 KG/M2

## 2020-03-12 DIAGNOSIS — R19.5 POSITIVE FECAL OCCULT BLOOD TEST: Primary | ICD-10-CM

## 2020-03-12 PROCEDURE — 99243 OFF/OP CNSLTJ NEW/EST LOW 30: CPT | Performed by: INTERNAL MEDICINE

## 2020-03-12 NOTE — PROGRESS NOTES
[facility] - Outpatient Note    NAME: Irineo Venegas  AGE: 20 y o  SEX: male    DATE OF ENCOUNTER: 3/12/2020    Assessment and Plan     1  Positive fecal occult blood test  · Patient has a history of triceps tendonitis and multiple other arthralgias and was following with a nutritionist to see if they had any kind of diet to recommend for his joint pains- he got a hemoccult test done at the recommendation of his nutritionist  · Patient currently does not have any symptoms of weight loss, change in stool consistency or habits, blood in stools, palpable masses, abdominal pain  · Patient declined a rectal exam  · It was recommended to the patient that he have a colonoscopy to evaluate the positive fecal occult blood test but the patient refused a colonoscopy  · He was told that if he ever develops any GI symptoms, he could give our office a call and we could schedule a colonoscopy      No orders of the defined types were placed in this encounter       - Counseling Documentation: patient was counseled regarding: diagnostic results, instructions for management, risk factor reductions, prognosis, patient and family education, impressions, risks and benefits of treatment options and importance of compliance with treatment  - Counseling Time: counseling time more than 50% of visit: 20 minutes  - Barriers to treatment: None  - Medication Side Effects: Adverse side effects of medications were reviewed with the patient/guardian today  - Self Referrals: No    Chief Complaint     Chief Complaint   Patient presents with    new patient     positive fecal test       History of Present Illness     Patient is a 65-year-old male with a past medical history of triceps tendonitis and multiple other joint pains (being worked up by orthopedics and rheumatology) who was seeing a nutritionist for his joint symptoms and got a GI mapping done    He was told that his GI mapping was abnormal with a score of 10 and he was recommended to get a Hemoccult test which came back positive  Patient is here for evaluation of the positive Hemoccult test   He denies any abdominal pain, melena, bright red blood per rectum, masses, change in stool habits, constipation, diarrhea, weight loss  Patient does not have a history of colon cancer in his family  The following portions of the patient's history were reviewed and updated as appropriate: allergies, current medications, past family history, past medical history, past social history, past surgical history and problem list     Review of Systems     Review of Systems   All other systems reviewed and are negative  Active Problem List     Patient Active Problem List   Diagnosis    Patellofemoral pain syndrome of both knees    Elbow clicking    Pain of both elbows    Elbow locking    Arthralgia    Fasciculation    Primary insomnia    Positive fecal occult blood test       Objective     BP 94/53 (BP Location: Right arm, Patient Position: Sitting, Cuff Size: Standard)   Pulse 77   Temp 98 °F (36 7 °C) (Tympanic)   Ht 5' 6" (1 676 m)   Wt 67 6 kg (149 lb)   BMI 24 05 kg/m²     Physical Exam   Constitutional: He is oriented to person, place, and time  He appears well-developed and well-nourished  Eyes: Pupils are equal, round, and reactive to light  Cardiovascular: Normal rate, regular rhythm and normal heart sounds  Pulmonary/Chest: Effort normal and breath sounds normal    Abdominal: Soft  Bowel sounds are normal  He exhibits no distension and no mass  There is no tenderness  There is no rebound and no guarding  No hernia  Patient declined a rectal exam   Neurological: He is alert and oriented to person, place, and time  Skin: Skin is warm  Current Medications   No current outpatient medications on file      Health Maintenance     Health Maintenance   Topic Date Due    HPV Vaccine (1 - Male 2-dose series) 06/25/2010    HIV Screening  06/25/2014  BMI: Followup Plan  06/25/2017    Annual Physical  06/25/2017    Influenza Vaccine  07/01/2019    PT PLAN OF CARE  01/30/2020    Depression Screening PHQ  11/13/2020    BMI: Adult  02/25/2021    DTaP,Tdap,and Td Vaccines (7 - Td) 06/30/2026    Pneumococcal Vaccine: 65+ Years (1 of 2 - PCV13) 06/25/2064    Hepatitis C Screening  Completed    HIB Vaccine  Completed    Hepatitis B Vaccine  Completed    IPV Vaccine  Completed    Pneumococcal Vaccine: Pediatrics (0 to 5 Years) and At-Risk Patients (6 to 59 Years)  Aged Out    Hepatitis A Vaccine  Aged Out    Meningococcal ACWY Vaccine  Aged [employer] History   Administered Date(s) Administered    DT (pediatric) 07/19/2004    DTP 1999, 1999, 1999, 01/10/2001    Hep B, adult 1999, 1999, 03/31/2000    Hib (PRP-OMP) 1999, 1999, 1999, 09/27/2000    INFLUENZA 10/01/2018    IPV 1999, 1999, 01/10/2001, 07/19/2004    MMR 09/27/2000, 07/19/2004    Meningococcal, Unknown Serogroups 03/09/2011    Tdap 03/09/2011, 06/30/2016    Varicella 09/27/2000, 03/04/2009       Teja Zimmerman  3/12/2020 3:19 PM

## 2020-03-12 NOTE — LETTER
March 15, 2020     Nadeen Mccall MD  [address]    Patient: Gracie Parekh   YOB: 2000   Date of Visit: 3/12/2020       Dear Dr Bonilla Vanegas:    Thank you for referring Broderick Davalos to me for evaluation  Below are my notes for this consultation  If you have questions, please do not hesitate to call me  I look forward to following your patient along with you  Sincerely,        Sohan Rothman MD        CC: No Recipients  Consuelo Briscoe MD  3/13/2020  9:05 PM  Attested Addendum    [facility] - Outpatient Note    NAME: Gracie Parekh  AGE: 20 y o  SEX: male    DATE OF ENCOUNTER: 3/12/2020    Assessment and Plan     1   Positive fecal occult blood test  · Patient has a history of triceps tendonitis and multiple other arthralgias and was following with a nutritionist to see if they had any kind of diet to recommend for his joint pains- he got a hemoccult test done at the recommendation of his nutritionist  · Patient currently does not have any symptoms of weight loss, change in stool consistency or habits, blood in stools, palpable masses, abdominal pain  · Patient declined a rectal exam  · It was recommended to the patient that he have a colonoscopy to evaluate the positive fecal occult blood test but the patient refused a colonoscopy  · He was told that if he ever develops any GI symptoms, he could give our office a call and we could schedule a colonoscopy      No orders of the defined types were placed in this encounter       - Counseling Documentation: patient was counseled regarding: diagnostic results, instructions for management, risk factor reductions, prognosis, patient and family education, impressions, risks and benefits of treatment options and importance of compliance with treatment  - Counseling Time: counseling time more than 50% of visit: 20 minutes  - Barriers to treatment: None  - Medication Side Effects: Adverse side effects of medications were reviewed with the patient/guardian today  - Self Referrals: No    Chief Complaint     Chief Complaint   Patient presents with    new patient     positive fecal test       History of Present Illness     Patient is a 65-year-old male with a past medical history of triceps tendonitis and multiple other joint pains (being worked up by orthopedics and rheumatology) who was seeing a nutritionist for his joint symptoms and got a GI mapping done  He was told that his GI mapping was abnormal with a score of 10 and he was recommended to get a Hemoccult test which came back positive  Patient is here for evaluation of the positive Hemoccult test   He denies any abdominal pain, melena, bright red blood per rectum, masses, change in stool habits, constipation, diarrhea, weight loss  Patient does not have a history of colon cancer in his family  The following portions of the patient's history were reviewed and updated as appropriate: allergies, current medications, past family history, past medical history, past social history, past surgical history and problem list     Review of Systems     Review of Systems   All other systems reviewed and are negative  Active Problem List     Patient Active Problem List   Diagnosis    Patellofemoral pain syndrome of both knees    Elbow clicking    Pain of both elbows    Elbow locking    Arthralgia    Fasciculation    Primary insomnia    Positive fecal occult blood test       Objective     BP 94/53 (BP Location: Right arm, Patient Position: Sitting, Cuff Size: Standard)   Pulse 77   Temp 98 °F (36 7 °C) (Tympanic)   Ht 5' 6" (1 676 m)   Wt 67 6 kg (149 lb)   BMI 24 05 kg/m²      Physical Exam   Constitutional: He is oriented to person, place, and time  He appears well-developed and well-nourished  Eyes: Pupils are equal, round, and reactive to light     Cardiovascular: Normal rate, regular rhythm and normal heart sounds  Pulmonary/Chest: Effort normal and breath sounds normal    Abdominal: Soft  Bowel sounds are normal  He exhibits no distension and no mass  There is no tenderness  There is no rebound and no guarding  No hernia  Patient declined a rectal exam   Neurological: He is alert and oriented to person, place, and time  Skin: Skin is warm  Current Medications   No current outpatient medications on file  Health Maintenance     Health Maintenance   Topic Date Due    HPV Vaccine (1 - Male 2-dose series) 06/25/2010    HIV Screening  06/25/2014    BMI: Followup Plan  06/25/2017    Annual Physical  06/25/2017    Influenza Vaccine  07/01/2019    PT PLAN OF CARE  01/30/2020    Depression Screening PHQ  11/13/2020    BMI: Adult  02/25/2021    DTaP,Tdap,and Td Vaccines (7 - Td) 06/30/2026    Pneumococcal Vaccine: 65+ Years (1 of 2 - PCV13) 06/25/2064    Hepatitis C Screening  Completed    HIB Vaccine  Completed    Hepatitis B Vaccine  Completed    IPV Vaccine  Completed    Pneumococcal Vaccine: Pediatrics (0 to 5 Years) and At-Risk Patients (6 to 59 Years)  Aged Out    Hepatitis A Vaccine  Aged Out    Meningococcal ACWY Vaccine  Aged [employer] History   Administered Date(s) Administered    DT (pediatric) 07/19/2004    DTP 1999, 1999, 1999, 01/10/2001    Hep B, adult 1999, 1999, 03/31/2000    Hib (PRP-OMP) 1999, 1999, 1999, 09/27/2000    INFLUENZA 10/01/2018    IPV 1999, 1999, 01/10/2001, 07/19/2004    MMR 09/27/2000, 07/19/2004    Meningococcal, Unknown Serogroups 03/09/2011    Tdap 03/09/2011, 06/30/2016    Varicella 09/27/2000, 03/04/2009       Efrain Loza  3/12/2020 3:19 PM  Attestation signed by Paco Douglas MD at 3/15/2020  7:35 AM:    I reviewed the care furnished by my resident during the visit   I was present in the office and personally saw and examined the patient and agree with his assessment and plan   He had a positive fecal immunochemical test is otherwise asymptomatic  We suggested a colonoscopy to rule polyps, and malignancy but he declined  We asked him to return if he develops any new symptoms or if he changes his mind about the colonoscopy      Amy Webb MD

## 2020-04-27 ENCOUNTER — OFFICE VISIT (OUTPATIENT)
Dept: FAMILY MEDICINE CLINIC | Facility: CLINIC | Age: 20
End: 2020-04-27
Payer: COMMERCIAL

## 2020-04-27 VITALS
WEIGHT: 155 LBS | HEIGHT: 66 IN | SYSTOLIC BLOOD PRESSURE: 116 MMHG | HEART RATE: 74 BPM | BODY MASS INDEX: 24.91 KG/M2 | DIASTOLIC BLOOD PRESSURE: 74 MMHG | TEMPERATURE: 97.9 F

## 2020-04-27 DIAGNOSIS — M25.521 PAIN OF BOTH ELBOWS: Primary | ICD-10-CM

## 2020-04-27 DIAGNOSIS — M25.522 PAIN OF BOTH ELBOWS: Primary | ICD-10-CM

## 2020-04-27 DIAGNOSIS — R19.5 POSITIVE FECAL OCCULT BLOOD TEST: ICD-10-CM

## 2020-04-27 PROCEDURE — 1036F TOBACCO NON-USER: CPT | Performed by: FAMILY MEDICINE

## 2020-04-27 PROCEDURE — 3008F BODY MASS INDEX DOCD: CPT | Performed by: FAMILY MEDICINE

## 2020-04-27 PROCEDURE — 99213 OFFICE O/P EST LOW 20 MIN: CPT | Performed by: FAMILY MEDICINE

## 2022-09-07 ENCOUNTER — TELEPHONE (OUTPATIENT)
Dept: FAMILY MEDICINE CLINIC | Facility: CLINIC | Age: 22
End: 2022-09-07

## 2022-09-08 NOTE — TELEPHONE ENCOUNTER
Pt is unable to come in today  States it's not urgent and he can come in some time next week after 12

## 2022-09-15 ENCOUNTER — OFFICE VISIT (OUTPATIENT)
Dept: FAMILY MEDICINE CLINIC | Facility: CLINIC | Age: 22
End: 2022-09-15
Payer: COMMERCIAL

## 2022-09-15 VITALS
DIASTOLIC BLOOD PRESSURE: 84 MMHG | WEIGHT: 152 LBS | HEIGHT: 66 IN | SYSTOLIC BLOOD PRESSURE: 120 MMHG | BODY MASS INDEX: 24.43 KG/M2 | TEMPERATURE: 97.9 F

## 2022-09-15 DIAGNOSIS — K52.9 CHRONIC DIARRHEA: Primary | ICD-10-CM

## 2022-09-15 PROCEDURE — 99214 OFFICE O/P EST MOD 30 MIN: CPT | Performed by: FAMILY MEDICINE

## 2022-09-15 PROCEDURE — 3725F SCREEN DEPRESSION PERFORMED: CPT | Performed by: FAMILY MEDICINE

## 2022-09-15 NOTE — PROGRESS NOTES
Name: Deborah Mohr      : 2000      MRN: [id number]  Encounter Provider: Preeti Mccray MD  Encounter Date: 9/15/2022   Encounter department: [address] [facility] [address] [facility] [address]     1  Chronic diarrhea  Assessment & Plan:  Patient diagnosed with IBS by [place] physician though no workup was done  Patient notes that stress is greatly decreased though he still has cyst the same symptoms  Patient has a history of chronic joint pains though workup for inflammation in the past was unremarkable  Will recheck labs including CRP and fecal calprotectin as well as stool culture  Consider GI eval for colonoscopy  Recheck after labs and other studies are none    Orders:  -     CBC and differential; Future  -     Comprehensive metabolic panel; Future  -     C-reactive protein; Future  -     TSH, 3rd generation with Free T4 reflex; Future  -     Stool culture; Future  -     White Blood Cells, Stool by Gram Stain; Future  -     Calprotectin,Fecal; Future           Subjective      48-year-old male presents with a 1 year history of chronic diarrhea  Patient is in the [employer] and states that 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  Patient was seen at sick call twice, 1st he was placed on an antibiotic and then 2nd time he was placed on Imodium-neither worked very well  Imodium muscle tended to upset his stomach  He has not had any weight loss or weight gain, black or tarry stools, bloody stools, or frequency associated with eating  Patient may have some fecal urgency but denies incontinence  He has a history of chronic joint aches/pains but has not noticed any joint swelling or worsening symptoms  He  also denies any rashes, dry eyes, dry mouth, sun sensitivity or other inflammatory symptoms    Patient admits to 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 but notes that stress has decreased since returning home, but symptoms have persisted  Weight stable  - no CV, resp,  or other concerns  - I reviewed PMHx, PSHx, Fam Hx and Soc Hx with pt  Review of Systems   Constitutional: Negative  HENT: Negative  Eyes: Negative  Respiratory: Negative  Cardiovascular: Negative  Gastrointestinal: Positive for abdominal pain and diarrhea  Negative for abdominal distention, constipation, nausea and vomiting  Endocrine: Negative  Genitourinary: Negative  Musculoskeletal: Positive for arthralgias and myalgias  Negative for gait problem and joint swelling  Skin: Negative  Allergic/Immunologic: Negative  Neurological: Negative  Hematological: Negative  Psychiatric/Behavioral: Negative  No current outpatient medications on file prior to visit  Objective     /84   Temp 97 9 °F (36 6 °C)   Ht 5' 6" (1 676 m)   Wt 68 9 kg (152 lb)   BMI 24 53 kg/m²     Physical Exam  Vitals reviewed  Constitutional:       Appearance: He is well-developed  HENT:      Head: Normocephalic and atraumatic  Right Ear: Tympanic membrane, ear canal and external ear normal       Left Ear: Tympanic membrane, ear canal and external ear normal    Eyes:      General: No scleral icterus  Extraocular Movements: Extraocular movements intact  Conjunctiva/sclera: Conjunctivae normal       Pupils: Pupils are equal, round, and reactive to light  Neck:      Thyroid: No thyromegaly  Vascular: No carotid bruit  Cardiovascular:      Rate and Rhythm: Normal rate and regular rhythm  Pulses: Normal pulses  Heart sounds: Normal heart sounds  No murmur heard  Pulmonary:      Effort: Pulmonary effort is normal       Breath sounds: Normal breath sounds  Abdominal:      General: Bowel sounds are normal  There is no distension  Palpations: Abdomen is soft  There is no mass  Tenderness: There is no abdominal tenderness     Musculoskeletal:         General: No swelling, tenderness or deformity  Normal range of motion  Cervical back: Normal range of motion and neck supple  No muscular tenderness  Right lower leg: No edema  Left lower leg: No edema  Lymphadenopathy:      Cervical: No cervical adenopathy  Skin:     General: Skin is warm and dry  Capillary Refill: Capillary refill takes less than 2 seconds  Neurological:      Mental Status: He is alert and oriented to person, place, and time  Cranial Nerves: No cranial nerve deficit  Sensory: No sensory deficit  Motor: No weakness  Gait: Gait normal    Psychiatric:         Mood and Affect: Mood normal          Behavior: Behavior normal          Thought Content:  Thought content normal          Judgment: Judgment normal        Lakeisha Quintana MD

## 2022-09-15 NOTE — PATIENT INSTRUCTIONS

## 2022-09-15 NOTE — PROGRESS NOTES
ADULT ANNUAL [address]    NAME: Ignacia Lomas  AGE: 20 y o  SEX: male  : 2000     DATE: 2022     Assessment and Plan:     Problem List Items Addressed This Visit        Digestive    Chronic diarrhea - Primary     Patient diagnosed with IBS by [facility] physician though no workup was done  Patient notes that stress is greatly decreased though he still has cyst the same symptoms  Patient has a history of chronic joint pains though workup for inflammation in the past was unremarkable  Will recheck labs including CRP and fecal calprotectin as well as stool culture  Consider GI eval for colonoscopy  Recheck after labs and other studies are none         Relevant Orders    CBC and differential    Comprehensive metabolic panel    C-reactive protein    TSH, 3rd generation with Free T4 reflex    Stool culture    White Blood Cells, Stool by Gram Stain    Calprotectin,Fecal          Immunizations and preventive care screenings were discussed with patient today  Appropriate education was printed on patient's after visit summary  Counseling:  · {Annual Physical; Counselin}         Return if symptoms worsen or fail to improve  Chief Complaint:     Chief Complaint   Patient presents with    Irritable Bowel Syndrome      History of Present Illness:     Adult Annual Physical   Patient here for a comprehensive physical exam  The patient reports {problems:66647}  Diet and Physical Activity  · Diet/Nutrition: {annual physical; diet:21478535}  · Exercise: {annual physical; exercise:60439501}  Depression Screening  PHQ-2/9 Depression Screening    Little interest or pleasure in doing things: 0 - not at all  Feeling down, depressed, or hopeless: 0 - not at all  PHQ-2 Score: 0  PHQ-2 Interpretation: Negative depression screen       General Health  · Sleep: {annual physical; sleep:2102}     · Hearing: {annual physical; hearin}  · Vision: {annual physical; vision:}  · Dental: {annual physical; dental:}   Health  · History of STDs? : {yes/no:09828}  Review of Systems:     Review of Systems   Past Medical History:     History reviewed  No pertinent past medical history  Past Surgical History:     History reviewed  No pertinent surgical history  Social History:     Social History     Socioeconomic History    Marital status: Single     Spouse name: None    Number of children: None    Years of education: None    Highest education level: None   Occupational History    None   Tobacco Use    Smoking status: Never Smoker    Smokeless tobacco: Never Used   Substance and Sexual Activity    Alcohol use: None    Drug use: None    Sexual activity: None   Other Topics Concern    None   Social History Narrative    None     Social Determinants of Health     Financial Resource Strain: Not on file   Food Insecurity: Not on file   Transportation Needs: Not on file   Physical Activity: Not on file   Stress: Not on file   Social Connections: Not on file   Intimate Partner Violence: Not on file   Housing Stability: Not on file      Family History:     Family History   Problem Relation Age of Onset    No Known Problems Mother     Parkinsonism Paternal Grandfather       Current Medications:     No current outpatient medications on file  No current facility-administered medications for this visit        Allergies:     No Known Allergies   Physical Exam:     /84   Temp 97 9 °F (36 6 °C)   Ht 5' 6" (1 676 m)   Wt 68 9 kg (152 lb)   BMI 24 53 kg/m²     Physical Exam     MD Edenilson Ruval[facility]abaV[facility]

## 2022-09-17 LAB
ALBUMIN SERPL-MCNC: 4.9 G/DL (ref 3.6–5.1)
ALBUMIN/GLOB SERPL: 2 (CALC) (ref 1–2.5)
ALP SERPL-CCNC: 67 U/L (ref 36–130)
ALT SERPL-CCNC: 19 U/L (ref 9–46)
AST SERPL-CCNC: 23 U/L (ref 10–40)
BASOPHILS # BLD AUTO: 40 CELLS/UL (ref 0–200)
BASOPHILS NFR BLD AUTO: 1.1 %
BILIRUB SERPL-MCNC: 0.5 MG/DL (ref 0.2–1.2)
BUN SERPL-MCNC: 13 MG/DL (ref 7–25)
BUN/CREAT SERPL: NORMAL (CALC) (ref 6–22)
CALCIUM SERPL-MCNC: 10.2 MG/DL (ref 8.6–10.3)
CHLORIDE SERPL-SCNC: 105 MMOL/L (ref 98–110)
CO2 SERPL-SCNC: 29 MMOL/L (ref 20–32)
CREAT SERPL-MCNC: 0.78 MG/DL (ref 0.6–1.24)
CRP SERPL-MCNC: 0.6 MG/L
EOSINOPHIL # BLD AUTO: 50 CELLS/UL (ref 15–500)
EOSINOPHIL NFR BLD AUTO: 1.4 %
ERYTHROCYTE [DISTWIDTH] IN BLOOD BY AUTOMATED COUNT: 11.8 % (ref 11–15)
GFR/BSA.PRED SERPLBLD CYS-BASED-ARV: 129 ML/MIN/1.73M2
GLOBULIN SER CALC-MCNC: 2.4 G/DL (CALC) (ref 1.9–3.7)
GLUCOSE SERPL-MCNC: 86 MG/DL (ref 65–99)
HCT VFR BLD AUTO: 44.7 % (ref 38.5–50)
HGB BLD-MCNC: 15 G/DL (ref 13.2–17.1)
LYMPHOCYTES # BLD AUTO: 1498 CELLS/UL (ref 850–3900)
LYMPHOCYTES NFR BLD AUTO: 41.6 %
MCH RBC QN AUTO: 31 PG (ref 27–33)
MCHC RBC AUTO-ENTMCNC: 33.6 G/DL (ref 32–36)
MCV RBC AUTO: 92.4 FL (ref 80–100)
MONOCYTES # BLD AUTO: 220 CELLS/UL (ref 200–950)
MONOCYTES NFR BLD AUTO: 6.1 %
NEUTROPHILS # BLD AUTO: 1793 CELLS/UL (ref 1500–7800)
NEUTROPHILS NFR BLD AUTO: 49.8 %
PLATELET # BLD AUTO: 278 THOUSAND/UL (ref 140–400)
PMV BLD REES-ECKER: 10.2 FL (ref 7.5–12.5)
POTASSIUM SERPL-SCNC: 4.3 MMOL/L (ref 3.5–5.3)
PROT SERPL-MCNC: 7.3 G/DL (ref 6.1–8.1)
RBC # BLD AUTO: 4.84 MILLION/UL (ref 4.2–5.8)
SODIUM SERPL-SCNC: 142 MMOL/L (ref 135–146)
TSH SERPL-ACNC: 1.76 MIU/L (ref 0.4–4.5)
WBC # BLD AUTO: 3.6 THOUSAND/UL (ref 3.8–10.8)

## 2022-09-18 PROBLEM — K52.9 CHRONIC DIARRHEA: Status: ACTIVE | Noted: 2022-09-18

## 2022-09-18 NOTE — ASSESSMENT & PLAN NOTE
Patient diagnosed with IBS by [place] physician though no workup was done  Patient notes that stress is greatly decreased though he still has cyst the same symptoms  Patient has a history of chronic joint pains though workup for inflammation in the past was unremarkable  Will recheck labs including CRP and fecal calprotectin as well as stool culture  Consider GI eval for colonoscopy    Recheck after labs and other studies are none

## 2022-09-20 ENCOUNTER — TELEPHONE (OUTPATIENT)
Dept: FAMILY MEDICINE CLINIC | Facility: CLINIC | Age: 22
End: 2022-09-20

## 2022-09-20 LAB
QUESTION/PROBLEM: NORMAL
SPECIMEN(S) RECEIVED: NORMAL

## 2022-09-20 NOTE — TELEPHONE ENCOUNTER
Marycarmen Davies called from [facility] re: order from 9/17/22  States the order that was placed was for a urine culture and they received a stool culture instead

## 2022-09-23 LAB — CALPROTECTIN STL-MCNT: 6 MCG/G

## 2022-10-13 ENCOUNTER — RA CDI HCC (OUTPATIENT)
Dept: OTHER | Facility: HOSPITAL | Age: 22
End: 2022-10-13

## 2022-10-13 NOTE — PROGRESS NOTES
[facility] [place] [facility]  coding opportunities       Chart reviewed, no opportunity found: CHART REVIEWED, NO OPPORTUNITY FOUND        Patients Insurance        Commercial Insurance: Kelton Brown

## 2022-10-20 ENCOUNTER — TELEPHONE (OUTPATIENT)
Dept: GASTROENTEROLOGY | Facility: CLINIC | Age: 22
End: 2022-10-20

## 2022-10-20 ENCOUNTER — OFFICE VISIT (OUTPATIENT)
Dept: FAMILY MEDICINE CLINIC | Facility: CLINIC | Age: 22
End: 2022-10-20
Payer: OTHER GOVERNMENT

## 2022-10-20 VITALS
BODY MASS INDEX: 24.75 KG/M2 | HEIGHT: 66 IN | HEART RATE: 70 BPM | TEMPERATURE: 97.9 F | DIASTOLIC BLOOD PRESSURE: 78 MMHG | OXYGEN SATURATION: 98 % | WEIGHT: 154 LBS | RESPIRATION RATE: 16 BRPM | SYSTOLIC BLOOD PRESSURE: 122 MMHG

## 2022-10-20 DIAGNOSIS — K52.9 CHRONIC DIARRHEA: Primary | ICD-10-CM

## 2022-10-20 PROCEDURE — 99214 OFFICE O/P EST MOD 30 MIN: CPT | Performed by: FAMILY MEDICINE

## 2022-10-20 NOTE — TELEPHONE ENCOUNTER
Patients GI provider:  Dr Leydi Mcguire     Number to return call: [phone]    Reason for call: Pt PCP referred pt to Dr Davin Hood  Pt has been seen in the past by Dr Leydi Mcguire 3/12/2020  Pt would like to transfer care from Dr Leydi Mcguire to Dr Davin Hood       Scheduled procedure/appointment date if applicable: Apt 30/07/2393

## 2022-10-20 NOTE — PROGRESS NOTES
Name: Amandeep Durbin      : 2000      MRN: [id number]  Encounter Provider: Ruth La MD  Encounter Date: 10/20/2022   Encounter department: [address] [place] [address]     1  Chronic diarrhea  Assessment & Plan:  I reviewed with patient  Labs were unremarkable, however diarrhea persists  Weight stable  Will refer patient to GI for possible colonoscopy  Recommend Imodium q a m  In the interim  Recheck 2 months    Orders:  -     Ambulatory Referral to Gastroenterology; Future           Subjective     f/u multiple med issues  - pt continues to have issues with bowels  Continues to have watery BMs or firm, thin BMs  Labs including stool studies have been unremarkable  Weight stable  No melena or hematochezia  - joint pains unchanged  No worsening  Pt denies any rashes, dry eyes/dry mouth or sunsensitivity  - no new CV, resp, or  issues          Review of Systems   Constitutional: Negative  HENT: Negative  Eyes: Negative  Respiratory: Negative  Cardiovascular: Negative  Gastrointestinal: Positive for diarrhea  Negative for abdominal distention, abdominal pain, constipation, nausea and vomiting  Endocrine: Negative  Genitourinary: Negative  Musculoskeletal: Positive for arthralgias (multiple, scattered)  Negative for back pain and joint swelling  Skin: Negative  Allergic/Immunologic: Negative  Neurological: Negative  Hematological: Negative  Psychiatric/Behavioral: Negative  History reviewed  No pertinent past medical history  History reviewed  No pertinent surgical history    Family History   Problem Relation Age of Onset   • No Known Problems Mother    • Parkinsonism Paternal Grandfather      Social History     Socioeconomic History   • Marital status: Single     Spouse name: None   • Number of children: None   • Years of education: None   • Highest education level: None   Occupational History   • None   Tobacco Use   • Smoking status: Never Smoker   • Smokeless tobacco: Never Used   Substance and Sexual Activity   • Alcohol use: None   • Drug use: None   • Sexual activity: None   Other Topics Concern   • None   Social History Narrative   • None     Social Determinants of Health     Financial Resource Strain: Not on file   Food Insecurity: Not on file   Transportation Needs: Not on file   Physical Activity: Not on file   Stress: Not on file   Social Connections: Not on file   Intimate Partner Violence: Not on file   Housing Stability: Not on file     No current outpatient medications on file prior to visit  No Known Allergies  Immunization History   Administered Date(s) Administered   • Adenovirus, unspecified 06/30/2017   • Anthrax 10/15/2021, 11/23/2021   • COVID-19 MODERNA VACC 0 5 ML IM 06/16/2021, 07/18/2021   • DT (pediatric) 07/19/2004   • DTP 2000, 2000, 2000, 01/10/2001   • Hep A / Hep B 06/30/2017, 09/05/2017   • Hep A, adult 10/19/2018   • Hep B, adult 1999, 1999, 03/31/2000, 01/25/2020   • Hib (PRP-OMP) 1999, 1999, 1999, 09/27/2000   • INFLUENZA 10/01/2018, 10/15/2021   • IPV 1999, 1999, 01/10/2001, 07/19/2004, 06/30/2017   • Influenza Quadrivalent Preservative Free 3 years and older IM 10/20/2018, 10/05/2019, 10/23/2020   • Influenza, seasonal, injectable, preservative free 11/17/2017   • MMR 09/27/2000, 07/19/2004, 06/30/2017, 09/05/2017   • Meningococcal MCV4P 06/30/2017   • Meningococcal, Unknown Serogroups 03/09/2011   • Tdap 03/09/2011, 06/30/2016, 06/30/2017   • Typhoid, ViCPs 10/15/2021   • Varicella 09/27/2000, 03/04/2009       Objective     /78   Pulse 70   Temp 97 9 °F (36 6 °C)   Resp 16   Ht 5' 6" (1 676 m)   Wt 69 9 kg (154 lb)   SpO2 98%   BMI 24 86 kg/m²     Physical Exam  Vitals reviewed  Constitutional:       Appearance: He is well-developed  HENT:      Head: Normocephalic and atraumatic        Mouth/Throat: Mouth: Mucous membranes are moist    Eyes:      General: No scleral icterus  Extraocular Movements: Extraocular movements intact  Conjunctiva/sclera: Conjunctivae normal       Pupils: Pupils are equal, round, and reactive to light  Comments: No pallor appreciated   Neck:      Thyroid: No thyromegaly  Cardiovascular:      Rate and Rhythm: Normal rate and regular rhythm  Pulses: Normal pulses  Heart sounds: Normal heart sounds  No murmur heard  Pulmonary:      Effort: Pulmonary effort is normal       Breath sounds: Normal breath sounds  Abdominal:      General: Bowel sounds are normal  There is no distension  Palpations: Abdomen is soft  There is no mass  Tenderness: There is no abdominal tenderness  Musculoskeletal:         General: No swelling, tenderness or deformity  Normal range of motion  Cervical back: Normal range of motion and neck supple  No tenderness  No muscular tenderness  Lymphadenopathy:      Cervical: No cervical adenopathy  Skin:     General: Skin is warm and dry  Capillary Refill: Capillary refill takes less than 2 seconds  Neurological:      Mental Status: He is alert and oriented to person, place, and time  Cranial Nerves: No cranial nerve deficit  Sensory: No sensory deficit  Motor: No weakness        Gait: Gait normal        Wendy Khalil MD

## 2022-10-23 NOTE — ASSESSMENT & PLAN NOTE
I reviewed with patient  Labs were unremarkable, however diarrhea persists  Weight stable  Will refer patient to GI for possible colonoscopy  Recommend Imodium q a m  In the interim    Recheck 2 months

## 2022-11-02 ENCOUNTER — OFFICE VISIT (OUTPATIENT)
Dept: GASTROENTEROLOGY | Facility: AMBULARY SURGERY CENTER | Age: 22
End: 2022-11-02

## 2022-11-02 ENCOUNTER — APPOINTMENT (OUTPATIENT)
Dept: LAB | Facility: AMBULARY SURGERY CENTER | Age: 22
End: 2022-11-02
Attending: INTERNAL MEDICINE

## 2022-11-02 VITALS
HEART RATE: 76 BPM | RESPIRATION RATE: 18 BRPM | DIASTOLIC BLOOD PRESSURE: 74 MMHG | BODY MASS INDEX: 24.3 KG/M2 | HEIGHT: 66 IN | OXYGEN SATURATION: 100 % | WEIGHT: 151.2 LBS | SYSTOLIC BLOOD PRESSURE: 128 MMHG

## 2022-11-02 DIAGNOSIS — R19.5 POSITIVE FECAL OCCULT BLOOD TEST: ICD-10-CM

## 2022-11-02 DIAGNOSIS — K52.9 CHRONIC DIARRHEA: ICD-10-CM

## 2022-11-02 DIAGNOSIS — K52.9 CHRONIC DIARRHEA: Primary | ICD-10-CM

## 2022-11-02 RX ORDER — DICYCLOMINE HYDROCHLORIDE 10 MG/1
10 CAPSULE ORAL
Qty: 90 CAPSULE | Refills: 2 | Status: SHIPPED | OUTPATIENT
Start: 2022-11-02

## 2022-11-02 NOTE — PATIENT INSTRUCTIONS
Take 1 tablespoon of metamucil/citrucel daily  Take 1 daily for 6-8 weeks (rony colon health/florastor/align)    Scheduled date of colonoscopy (as of today):12/15/2022  Physician performing colonoscopy:DR GUILLERMO  Location of colonoscopy:[facility]  Bowel prep reviewed with patient:MIRALAX/DULCOLAX PER DR GUILLERMO  Instructions reviewed with patient by:TARI BARNES  Clearances:

## 2022-11-02 NOTE — LETTER
November 2, 2022     Vini Armstrong MD  [address]  [address] [facility]    Patient: Wilmar Douglass   YOB: 2000   Date of Visit: 11/2/2022       Dear Dr Nicole Gomez:    Thank you for referring Lien Curly to me for evaluation  Below are my notes for this consultation  If you have questions, please do not hesitate to call me  I look forward to following your patient along with you  Sincerely,        Aleida Andre MD        CC: No Recipients  Aleida Andre MD  11/2/2022  9:17 PM  Sign when Signing Visit  [facility]  Wilmar Douglass 20 y o  male MRN: [id number]            Assessment & Plan:    79-year-old gentleman with longstanding history of loose stools, heme-positive stools    1  Diarrhea with heme-positive stools:  -differential including hemorrhoids, IBS, celiac sprue, inflammatory bowel disease  -we will check laboratory studies including celiac panel   -recommended trial of dicyclomine, did fiber supplementation  -patient has already tried lactose-free diet without improvement symptoms  -we will proceed with colonoscopy to further evaluate  -discussed with him risks of procedure including bleeding, surgery, perforation  -trial of daily probiotic    Timoteo was seen today for diarrhea  Diagnoses and all orders for this visit:    Chronic diarrhea  -     Ambulatory Referral to Gastroenterology  -     dicyclomine (BENTYL) 10 mg capsule; Take 1 capsule (10 mg total) by mouth 3 (three) times a day before meals  -     Celiac Disease Antibody Profile; Future  -     Colonoscopy; Future    Positive fecal occult blood test    Other orders  -     Diet NPO; Sips with meds; Standing  -     Void on call to OR; Standing            _____________________________________________________________        CC:  Diarrhea    HPI:  Wilmar Douglass is a 22 y o male who is here for diarrhea    79-year-old gentleman, reports longstanding history of loose stools for the last 1 year at least   Reports frequent bowel movements, he is in the [facility] usually has 8 to 10 loose bowel movements per day with narrow caliber stools  He notes that since coming back home he has about 4 to 5 bowel movements per day denies any blood or mucus  Reports some general umbilical discomfort, aching and burning sensation  Reports some occasional nausea, no vomiting  He notes that stress seems to worsen his symptoms  He was seen by the doctors in regard, given antibiotics and improvement symptoms, then given Imodium without any significant improvement  His weight has been stable  It appears existence a history of joint pains  Was seen on office in the past for heme-positive stools did not follow up for colonoscopy  No other significant medical or surgical history  Denies any tobacco alcohol  Denies any drug use  He is in the [facility]       , history is notable for loose stools in his mother  ROS:  The remainder of the ROS was negative except for the pertinent positives mentioned in HPI  Allergies: Patient has no known allergies  Medications:   Current Outpatient Medications:   •  dicyclomine (BENTYL) 10 mg capsule, Take 1 capsule (10 mg total) by mouth 3 (three) times a day before meals, Disp: 90 capsule, Rfl: 2    History reviewed  No pertinent past medical history  History reviewed  No pertinent surgical history  Family History   Problem Relation Age of Onset   • No Known Problems Mother    • Parkinsonism Paternal Grandfather         reports that he has never smoked   He has never used smokeless tobacco       Physical Exam:    /74 (BP Location: Left arm, Patient Position: Sitting, Cuff Size: Standard)   Pulse 76   Resp 18   Ht 5' 6" (1 676 m)   Wt 68 6 kg (151 lb 3 2 oz)   SpO2 100%   BMI 24 40 kg/m²     Gen: wn/wd, NAD, healthy-appearing gentleman  HEENT: anicteric, MMM, no cervical LAD  CVS: RRR, no m/r/g  CHEST: CTA b/l  ABD: +BS, soft, NT,ND, no hepatosplenomegaly  EXT: no c/c/e  NEURO: aaox3  SKIN: NO rashes

## 2022-11-03 NOTE — PROGRESS NOTES
[address] [facility]  Daniel Lawson 20 y o  male MRN: [id number]            Assessment & Plan:    23-year-old gentleman with longstanding history of loose stools, heme-positive stools    1  Diarrhea with heme-positive stools:  -differential including hemorrhoids, IBS, celiac sprue, inflammatory bowel disease  -we will check laboratory studies including celiac panel   -recommended trial of dicyclomine, did fiber supplementation  -patient has already tried lactose-free diet without improvement symptoms  -we will proceed with colonoscopy to further evaluate  -discussed with him risks of procedure including bleeding, surgery, perforation  -trial of daily probiotic    Timotoe was seen today for diarrhea  Diagnoses and all orders for this visit:    Chronic diarrhea  -     Ambulatory Referral to Gastroenterology  -     dicyclomine (BENTYL) 10 mg capsule; Take 1 capsule (10 mg total) by mouth 3 (three) times a day before meals  -     Celiac Disease Antibody Profile; Future  -     Colonoscopy; Future    Positive fecal occult blood test    Other orders  -     Diet NPO; Sips with meds; Standing  -     Void on call to OR; Standing            _____________________________________________________________        CC:  Diarrhea    HPI:  Daniel Lawson is a 22 y o male who is here for diarrhea  48-year-old gentleman, reports longstanding history of loose stools for the last 1 year at least   Reports frequent bowel movements, he is in the [employer] usually has 8 to 10 loose bowel movements per day with narrow caliber stools  He notes that since coming back home he has about 4 to 5 bowel movements per day denies any blood or mucus  Reports some general umbilical discomfort, aching and burning sensation  Reports some occasional nausea, no vomiting  He notes that stress seems to worsen his symptoms        He was seen by the doctors in regard, given antibiotics and improvement symptoms, then given Imodium without any significant improvement  His weight has been stable  It appears existence a history of joint pains  Was seen on office in the past for heme-positive stools did not follow up for colonoscopy  No other significant medical or surgical history  Denies any tobacco alcohol  Denies any drug use  He is in the [place]       , history is notable for loose stools in his mother  ROS:  The remainder of the ROS was negative except for the pertinent positives mentioned in HPI  Allergies: Patient has no known allergies  Medications:   Current Outpatient Medications:   •  dicyclomine (BENTYL) 10 mg capsule, Take 1 capsule (10 mg total) by mouth 3 (three) times a day before meals, Disp: 90 capsule, Rfl: 2    History reviewed  No pertinent past medical history  History reviewed  No pertinent surgical history  Family History   Problem Relation Age of Onset   • No Known Problems Mother    • Parkinsonism Paternal Grandfather         reports that he has never smoked   He has never used smokeless tobacco       Physical Exam:    /74 (BP Location: Left arm, Patient Position: Sitting, Cuff Size: Standard)   Pulse 76   Resp 18   Ht 5' 6" (1 676 m)   Wt 68 6 kg (151 lb 3 2 oz)   SpO2 100%   BMI 24 40 kg/m²     Gen: wn/wd, NAD, healthy-appearing gentleman  HEENT: anicteric, MMM, no cervical LAD  CVS: RRR, no m/r/g  CHEST: CTA b/l  ABD: +BS, soft, NT,ND, no hepatosplenomegaly  EXT: no c/c/e  NEURO: aaox3  SKIN: NO rashes

## 2022-11-04 LAB
ENDOMYSIUM IGA SER QL: NEGATIVE
GLIADIN PEPTIDE IGA SER-ACNC: 3 UNITS (ref 0–19)
GLIADIN PEPTIDE IGG SER-ACNC: 1 UNITS (ref 0–19)
IGA SERPL-MCNC: 144 MG/DL (ref 90–386)
TTG IGA SER-ACNC: <2 U/ML (ref 0–3)
TTG IGG SER-ACNC: <2 U/ML (ref 0–5)

## 2022-12-15 ENCOUNTER — ANESTHESIA EVENT (OUTPATIENT)
Dept: GASTROENTEROLOGY | Facility: AMBULARY SURGERY CENTER | Age: 22
End: 2022-12-15

## 2022-12-15 ENCOUNTER — HOSPITAL ENCOUNTER (OUTPATIENT)
Dept: GASTROENTEROLOGY | Facility: AMBULARY SURGERY CENTER | Age: 22
Setting detail: OUTPATIENT SURGERY
End: 2022-12-15
Attending: INTERNAL MEDICINE

## 2022-12-15 ENCOUNTER — ANESTHESIA (OUTPATIENT)
Dept: GASTROENTEROLOGY | Facility: AMBULARY SURGERY CENTER | Age: 22
End: 2022-12-15

## 2022-12-15 VITALS
TEMPERATURE: 97.8 F | OXYGEN SATURATION: 98 % | SYSTOLIC BLOOD PRESSURE: 113 MMHG | HEART RATE: 67 BPM | RESPIRATION RATE: 22 BRPM | HEIGHT: 66 IN | DIASTOLIC BLOOD PRESSURE: 65 MMHG | WEIGHT: 148 LBS | BODY MASS INDEX: 23.78 KG/M2

## 2022-12-15 DIAGNOSIS — K52.9 CHRONIC DIARRHEA: ICD-10-CM

## 2022-12-15 RX ORDER — LIDOCAINE HYDROCHLORIDE 10 MG/ML
INJECTION, SOLUTION EPIDURAL; INFILTRATION; INTRACAUDAL; PERINEURAL AS NEEDED
Status: DISCONTINUED | OUTPATIENT
Start: 2022-12-15 | End: 2022-12-15

## 2022-12-15 RX ORDER — SODIUM CHLORIDE, SODIUM LACTATE, POTASSIUM CHLORIDE, CALCIUM CHLORIDE 600; 310; 30; 20 MG/100ML; MG/100ML; MG/100ML; MG/100ML
INJECTION, SOLUTION INTRAVENOUS CONTINUOUS PRN
Status: DISCONTINUED | OUTPATIENT
Start: 2022-12-15 | End: 2022-12-15

## 2022-12-15 RX ORDER — PROPOFOL 10 MG/ML
INJECTION, EMULSION INTRAVENOUS CONTINUOUS PRN
Status: DISCONTINUED | OUTPATIENT
Start: 2022-12-15 | End: 2022-12-15

## 2022-12-15 RX ORDER — PROPOFOL 10 MG/ML
INJECTION, EMULSION INTRAVENOUS AS NEEDED
Status: DISCONTINUED | OUTPATIENT
Start: 2022-12-15 | End: 2022-12-15

## 2022-12-15 RX ADMIN — PROPOFOL 140 MCG/KG/MIN: 10 INJECTION, EMULSION INTRAVENOUS at 14:28

## 2022-12-15 RX ADMIN — PROPOFOL 150 MG: 10 INJECTION, EMULSION INTRAVENOUS at 14:25

## 2022-12-15 RX ADMIN — LIDOCAINE HYDROCHLORIDE 50 MG: 10 INJECTION, SOLUTION EPIDURAL; INFILTRATION; INTRACAUDAL at 14:28

## 2022-12-15 RX ADMIN — SODIUM CHLORIDE, SODIUM LACTATE, POTASSIUM CHLORIDE, AND CALCIUM CHLORIDE: .6; .31; .03; .02 INJECTION, SOLUTION INTRAVENOUS at 14:20

## 2022-12-15 RX ADMIN — PROPOFOL 50 MG: 10 INJECTION, EMULSION INTRAVENOUS at 14:27

## 2022-12-15 NOTE — DISCHARGE INSTRUCTIONS
Colonoscopy   WHAT YOU NEED TO KNOW:   A colonoscopy is a procedure to examine the inside of your colon (intestine) with a scope  Polyps or tissue growths may have been removed during your colonoscopy  It is normal to feel bloated and to have some abdominal discomfort  You should be passing gas  If you have hemorrhoids or you had polyps removed, you may have a small amount of bleeding  DISCHARGE INSTRUCTIONS:   Seek care immediately if:   You have sudden, severe abdominal pain  You have problems swallowing  You have a large amount of black, sticky bowel movements or blood in your bowel movements  You have sudden trouble breathing  You feel weak, lightheaded, or faint or your heart beats faster than normal for you  Contact your healthcare provider if:   You have a fever and chills  You have nausea or are vomiting  Your abdomen is bloated or feels full and hard  You have abdominal pain  You have black, sticky bowel movements or blood in your bowel movements  You have not had a bowel movement for 3 days after your procedure  You have rash or hives  You have questions or concerns about your procedure  Activity:   Do not lift, strain, or run for 24 hours after your procedure  Rest after your procedure  You have been given medicine to relax you  Do not drive or make important decisions until the day after your procedure  Return to your normal activity as directed  Relieve gas and discomfort from bloating by lying on your right side with a heating pad on your abdomen  You may need to take short walks to help the gas move out  Eat small meals until bloating is relieved  Follow up with your healthcare provider as directed: Write down your questions so you remember to ask them during your visits  If you take a “blood thinner”, please review the specific instructions from your endoscopist about when you should resume it   These can be found in the “Recommendation” and “Your Medication list” sections of this After Visit Summary  12 Hour(s) 55 Minute(s)

## 2022-12-15 NOTE — ANESTHESIA POSTPROCEDURE EVALUATION
Post-Op Assessment Note    CV Status:  Stable  Pain Score: 0    Pain management: adequate     Mental Status:  Alert and awake   Hydration Status:  Euvolemic   PONV Controlled:  Controlled   Airway Patency:  Patent      Post Op Vitals Reviewed: Yes      Staff: CRNA         No notable events documented      /57 (12/15/22 1442)    Temp 97 8 °F (36 6 °C) (12/15/22 1442)    Pulse 84 (12/15/22 1442)   Resp 15 (12/15/22 1442)    SpO2 97 % (12/15/22 1442)

## 2022-12-15 NOTE — H&P
History and Physical -  [facility]  Raisa Ornelas 20 y o  male MRN: [id number]    HPI: Raisa Ornelas is a 20y o  year old male who presents with loose stools, heme positive stool test        Review of Systems    Historical Information   Past Medical History:   Diagnosis Date   • Diarrhea      Past Surgical History:   Procedure Laterality Date   • NO PAST SURGERIES       Social History   Social History     Substance and Sexual Activity   Alcohol Use Never     Social History     Substance and Sexual Activity   Drug Use Never     Social History     Tobacco Use   Smoking Status Never   Smokeless Tobacco Never     Family History   Problem Relation Age of Onset   • No Known Problems Mother    • Parkinsonism Paternal Grandfather        Meds/Allergies     (Not in a hospital admission)      No Known Allergies    Objective     /75   Pulse 73   Temp 97 6 °F (36 4 °C) (Temporal)   Resp 18   Ht 5' 6" (1 676 m)   Wt 67 1 kg (148 lb)   SpO2 99%   BMI 23 89 kg/m²       PHYSICAL EXAM    Gen: NAD  CV: RRR  CHEST: Clear  ABD: soft, NT/ND  EXT: no edema  Neuro: AAO      ASSESSMENT/PLAN:  This is a 20y o  year old male here for  loose stools, heme positive stool test        PLAN:   Procedure: colonoscopy

## 2022-12-15 NOTE — ANESTHESIA PREPROCEDURE EVALUATION
Procedure:  COLONOSCOPY    Relevant Problems   Digestive   (+) Chronic diarrhea        Physical Exam    Airway    Mallampati score: I  TM Distance: >3 FB  Neck ROM: full     Dental   No notable dental hx     Cardiovascular      Pulmonary      Other Findings        Anesthesia Plan  ASA Score- 1     Anesthesia Type- IV sedation with anesthesia with ASA Monitors  Additional Monitors:   Airway Plan:           Plan Factors-Exercise tolerance (METS): >4 METS  Chart reviewed  Existing labs reviewed  Patient summary reviewed  Induction- intravenous  Postoperative Plan-     Informed Consent- Anesthetic plan and risks discussed with patient  I personally reviewed this patient with the CRNA  Discussed and agreed on the Anesthesia Plan with the CRNA  Louis Johnson

## 2023-02-06 DIAGNOSIS — K52.9 CHRONIC DIARRHEA: ICD-10-CM

## 2023-02-06 RX ORDER — DICYCLOMINE HYDROCHLORIDE 10 MG/1
10 CAPSULE ORAL
Qty: 90 CAPSULE | Refills: 2 | Status: SHIPPED | OUTPATIENT
Start: 2023-02-06

## 2023-02-24 ENCOUNTER — OFFICE VISIT (OUTPATIENT)
Dept: FAMILY MEDICINE CLINIC | Facility: CLINIC | Age: 23
End: 2023-02-24

## 2023-02-24 VITALS
BODY MASS INDEX: 23.95 KG/M2 | SYSTOLIC BLOOD PRESSURE: 120 MMHG | WEIGHT: 149 LBS | OXYGEN SATURATION: 97 % | HEIGHT: 66 IN | HEART RATE: 67 BPM | DIASTOLIC BLOOD PRESSURE: 72 MMHG | TEMPERATURE: 97.1 F

## 2023-02-24 DIAGNOSIS — K52.9 CHRONIC DIARRHEA: ICD-10-CM

## 2023-02-24 DIAGNOSIS — Z00.00 ANNUAL PHYSICAL EXAM: Primary | ICD-10-CM

## 2023-02-24 RX ORDER — CHOLESTYRAMINE 4 G/9G
1 POWDER, FOR SUSPENSION ORAL 2 TIMES DAILY WITH MEALS
Qty: 60 PACKET | Refills: 2 | Status: SHIPPED | OUTPATIENT
Start: 2023-02-24

## 2023-02-24 NOTE — ASSESSMENT & PLAN NOTE
I reviewed GI work-up and colonoscopy results  Appears to be diarrhea dominant IBS  We will start patient on cholestyramine 4 g packet, 1 packet every morning  If improving but not resolving after 1 to 2 weeks, increase to twice daily    Patient will call in 3 to 4 weeks with follow-up otherwise follow-up in 6 months

## 2023-02-24 NOTE — PROGRESS NOTES
ADULT ANNUAL [facility]    NAME: Kathleen Lock  AGE: 20 y o  SEX: male  : 2000     DATE: 2023     Assessment and Plan:     Problem List Items Addressed This Visit        Digestive    Chronic diarrhea     I reviewed GI work-up and colonoscopy results  Appears to be diarrhea dominant IBS  We will start patient on cholestyramine 4 g packet, 1 packet every morning  If improving but not resolving after 1 to 2 weeks, increase to twice daily  Patient will call in 3 to 4 weeks with follow-up otherwise follow-up in 6 months         Relevant Medications    cholestyramine (QUESTRAN) 4 g packet   Other Visit Diagnoses     Annual physical exam    -  Primary          Immunizations and preventive care screenings were discussed with patient today  Appropriate education was printed on patient's after visit summary  Counseling:  Exercise: the importance of regular exercise/physical activity was discussed  Recommend exercise 3-5 times per week for at least 30 minutes  Return in about 6 months (around 2023)  Chief Complaint:     Chief Complaint   Patient presents with   • Annual Exam     Also 4 month f/u-discuss gastro consult, would like to discuss new med, Bentyl      History of Present Illness:     Adult Annual Physical   Patient here for a comprehensive physical exam  The patient reports problems - as above  - still has diarrhea just about every day  GI work up was unremarkable  Diet and Physical Activity  Diet/Nutrition: well balanced diet  Exercise: moderate cardiovascular exercise, strength training exercises, 5-7 times a week on average and 30-60 minutes on average        Depression Screening  PHQ-2/9 Depression Screening    Little interest or pleasure in doing things: 0 - not at all  Feeling down, depressed, or hopeless: 0 - not at all  PHQ-2 Score: 0  PHQ-2 Interpretation: Negative depression screen General Health  Sleep: sleeps well and gets 7-8 hours of sleep on average  Hearing: normal - bilateral   Vision: no vision problems, most recent eye exam >1 year ago and wears glasses  Dental: regular dental visits and brushes teeth twice daily   Health  History of STDs?: no      Review of Systems:     Review of Systems   Constitutional: Negative  HENT: Negative  Eyes: Negative  Respiratory: Negative  Cardiovascular: Negative  Gastrointestinal: Positive for diarrhea  Negative for abdominal distention, abdominal pain, nausea and vomiting  Endocrine: Negative  Genitourinary: Negative  Musculoskeletal: Negative  Skin: Negative  Allergic/Immunologic: Negative  Neurological: Negative  Hematological: Negative  Psychiatric/Behavioral: Negative         Past Medical History:     Past Medical History:   Diagnosis Date   • Diarrhea       Past Surgical History:     Past Surgical History:   Procedure Laterality Date   • NO PAST SURGERIES        Social History:     Social History     Socioeconomic History   • Marital status: Single     Spouse name: None   • Number of children: None   • Years of education: None   • Highest education level: None   Occupational History   • None   Tobacco Use   • Smoking status: Never   • Smokeless tobacco: Never   Vaping Use   • Vaping Use: Never used   Substance and Sexual Activity   • Alcohol use: Never   • Drug use: Never   • Sexual activity: None   Other Topics Concern   • None   Social History Narrative   • None     Social Determinants of Health     Financial Resource Strain: Not on file   Food Insecurity: Not on file   Transportation Needs: Not on file   Physical Activity: Not on file   Stress: Not on file   Social Connections: Not on file   Intimate Partner Violence: Not on file   Housing Stability: Not on file      Family History:     Family History   Problem Relation Age of Onset   • No Known Problems Mother    • Parkinsonism Paternal Grandfather       Current Medications:     Current Outpatient Medications   Medication Sig Dispense Refill   • cholestyramine (QUESTRAN) 4 g packet Take 1 packet (4 g total) by mouth 2 (two) times a day with meals 60 packet 2   • dicyclomine (BENTYL) 10 mg capsule Take 1 capsule (10 mg total) by mouth 3 (three) times a day before meals 90 capsule 2     No current facility-administered medications for this visit  Allergies:     No Known Allergies   Physical Exam:     /72 (BP Location: Left arm, Patient Position: Sitting, Cuff Size: Adult)   Pulse 67   Temp (!) 97 1 °F (36 2 °C)   Ht 5' 6" (1 676 m)   Wt 67 6 kg (149 lb)   SpO2 97%   BMI 24 05 kg/m²     Physical Exam  Vitals and nursing note reviewed  Constitutional:       General: He is not in acute distress  Appearance: Normal appearance  He is well-developed  HENT:      Head: Normocephalic and atraumatic  Right Ear: Tympanic membrane, ear canal and external ear normal       Left Ear: Tympanic membrane, ear canal and external ear normal       Nose: Nose normal       Mouth/Throat:      Mouth: Mucous membranes are moist    Eyes:      Extraocular Movements: Extraocular movements intact  Conjunctiva/sclera: Conjunctivae normal       Pupils: Pupils are equal, round, and reactive to light  Cardiovascular:      Rate and Rhythm: Normal rate and regular rhythm  Pulses: Normal pulses  Heart sounds: No murmur heard  Pulmonary:      Effort: Pulmonary effort is normal  No respiratory distress  Breath sounds: Normal breath sounds  Abdominal:      General: There is no distension  Palpations: Abdomen is soft  There is no mass  Tenderness: There is no abdominal tenderness  There is no right CVA tenderness or left CVA tenderness  Musculoskeletal:         General: No swelling, tenderness or deformity  Cervical back: Neck supple  No tenderness  Right lower leg: No edema  Left lower leg: No edema  Lymphadenopathy:      Cervical: No cervical adenopathy  Skin:     General: Skin is warm and dry  Capillary Refill: Capillary refill takes less than 2 seconds  Neurological:      General: No focal deficit present  Mental Status: He is alert and oriented to person, place, and time  Cranial Nerves: No cranial nerve deficit  Sensory: No sensory deficit  Motor: No weakness  Coordination: Coordination normal       Gait: Gait normal       Deep Tendon Reflexes: Reflexes normal    Psychiatric:         Mood and Affect: Mood normal          Behavior: Behavior normal          Thought Content:  Thought content normal          Judgment: Judgment normal           MD Devendra Graham

## 2023-06-10 DIAGNOSIS — K52.9 CHRONIC DIARRHEA: ICD-10-CM

## 2023-06-12 RX ORDER — CHOLESTYRAMINE 4 G/9G
POWDER, FOR SUSPENSION ORAL
Qty: 60 PACKET | Refills: 2 | Status: SHIPPED | OUTPATIENT
Start: 2023-06-12

## 2023-09-09 DIAGNOSIS — K52.9 CHRONIC DIARRHEA: ICD-10-CM

## 2023-09-11 RX ORDER — CHOLESTYRAMINE 4 G/9G
POWDER, FOR SUSPENSION ORAL
Qty: 180 PACKET | Refills: 0 | Status: SHIPPED | OUTPATIENT
Start: 2023-09-11

## 2023-12-10 DIAGNOSIS — K52.9 CHRONIC DIARRHEA: ICD-10-CM

## 2023-12-11 RX ORDER — CHOLESTYRAMINE 4 G/9G
POWDER, FOR SUSPENSION ORAL
Qty: 180 PACKET | Refills: 0 | Status: SHIPPED | OUTPATIENT
Start: 2023-12-11

## 2024-01-15 ENCOUNTER — OFFICE VISIT (OUTPATIENT)
Dept: FAMILY MEDICINE CLINIC | Facility: CLINIC | Age: 24
End: 2024-01-15
Payer: OTHER GOVERNMENT

## 2024-01-15 VITALS
SYSTOLIC BLOOD PRESSURE: 110 MMHG | WEIGHT: 154 LBS | HEIGHT: 68 IN | HEART RATE: 78 BPM | TEMPERATURE: 97.2 F | DIASTOLIC BLOOD PRESSURE: 74 MMHG | OXYGEN SATURATION: 98 % | BODY MASS INDEX: 23.34 KG/M2

## 2024-01-15 DIAGNOSIS — F41.8 SITUATIONAL ANXIETY: ICD-10-CM

## 2024-01-15 DIAGNOSIS — K52.9 CHRONIC DIARRHEA: Primary | ICD-10-CM

## 2024-01-15 PROCEDURE — 99214 OFFICE O/P EST MOD 30 MIN: CPT | Performed by: FAMILY MEDICINE

## 2024-01-15 NOTE — PROGRESS NOTES
"Name: Timoteo Lagunas      : 2000      MRN: [id number]  Encounter Provider: Vimal Thomas MD  Encounter Date: 1/15/2024   Encounter department: [facility]    Assessment & Plan     1. Chronic diarrhea  Assessment & Plan:  Doing better since starting cholestyramine. Continue present care. Monitor diet. Recheck 6m      2. Situational anxiety  Assessment & Plan:  Does well as long as he keep his mind \"active\" - worse when he has nothing else to focus on. Discussed treatment options/strategies. Continue to monitor. Recheck 6m             Subjective     f/u multiple med issues  - diarrhea has become \"manageable\" starting cholestyramine.  Patient states that he takes usually 1 packet a day.  If he takes more, he leans towards constipation.  Patient has not found any association between symptoms and specific foods in his diet.  Presently he states that he eats \"a normal diet\".  - has some anxiety re: having episodes of diarrhea when he does not have a nearby bathroom. Pt states that he is better when he is busy and does not think about it. Denies daily worries. PHQ and LAQUITA done  - no new CV, resp,  or other concerns      Review of Systems   Constitutional: Negative.    HENT: Negative.     Eyes: Negative.    Respiratory: Negative.     Cardiovascular: Negative.    Gastrointestinal:  Positive for diarrhea (occasional). Negative for abdominal distention, blood in stool and constipation.   Endocrine: Negative.    Genitourinary: Negative.    Skin: Negative.    Allergic/Immunologic: Negative.    Neurological: Negative.    Hematological: Negative.    Psychiatric/Behavioral:  Negative for dysphoric mood, sleep disturbance and suicidal ideas. The patient is nervous/anxious (occasional).        Past Medical History:   Diagnosis Date   • Diarrhea      Past Surgical History:   Procedure Laterality Date   • NO PAST SURGERIES       Family History   Problem Relation Age of Onset   • No Known " Problems Mother    • Parkinsonism Paternal Grandfather      Social History     Socioeconomic History   • Marital status: Single     Spouse name: None   • Number of children: None   • Years of education: None   • Highest education level: None   Occupational History   • None   Tobacco Use   • Smoking status: Never   • Smokeless tobacco: Never   Vaping Use   • Vaping status: Never Used   Substance and Sexual Activity   • Alcohol use: Never   • Drug use: Never   • Sexual activity: Yes     Partners: Female     Birth control/protection: Condom Male   Other Topics Concern   • None   Social History Narrative   • None     Social Determinants of Health     Financial Resource Strain: Not on file   Food Insecurity: Not on file   Transportation Needs: Not on file   Physical Activity: Not on file   Stress: Not on file   Social Connections: Not on file   Intimate Partner Violence: Not on file   Housing Stability: Not on file     Current Outpatient Medications on File Prior to Visit   Medication Sig   • cholestyramine (QUESTRAN) 4 g packet MIX 1 PACKET (4 GRAMS) IN AT LEAST 2 TO 6 OUNCES OF FLUID AND DRINK TWICE A DAY WITH MEALS.     No Known Allergies  Immunization History   Administered Date(s) Administered   • Adenovirus, unspecified 06/30/2017   • Anthrax 10/15/2021, 11/23/2021   • COVID-19 MODERNA VACC 0.5 ML IM 06/16/2021, 07/18/2021   • DT (pediatric) 07/19/2004   • DTP 2000, 2000, 1999, 01/10/2001   • Hep A / Hep B 06/30/2017, 09/05/2017   • Hep A, adult 10/19/2018   • Hep B, adult 1999, 1999, 03/31/2000, 01/25/2020   • Hib (PRP-OMP) 1999, 1999, 1999, 09/27/2000   • INFLUENZA 10/01/2018, 10/15/2021   • IPV 1999, 1999, 01/10/2001, 07/19/2004, 06/30/2017   • Influenza Quadrivalent Preservative Free 3 years and older IM 10/20/2018, 10/05/2019, 10/23/2020   • Influenza, seasonal, injectable, preservative free 11/17/2017   • MMR 09/27/2000, 07/19/2004, 06/30/2017,  "09/05/2017   • Meningococcal MCV4P 06/30/2017   • Meningococcal, Unknown Serogroups 03/09/2011   • Tdap 03/09/2011, 06/30/2016, 06/30/2017   • Typhoid, ViCPs 10/15/2021   • Varicella 09/27/2000, 03/04/2009       Objective     /74 (BP Location: Left arm, Patient Position: Sitting, Cuff Size: Adult)   Pulse 78   Temp (!) 97.2 °F (36.2 °C)   Ht 5' 7.72\" (1.72 m)   Wt 69.9 kg (154 lb)   SpO2 98%   BMI 23.61 kg/m²     Physical Exam  Vitals reviewed.   HENT:      Head: Normocephalic.      Right Ear: Tympanic membrane, ear canal and external ear normal.      Left Ear: Tympanic membrane, ear canal and external ear normal.      Nose: Nose normal.      Mouth/Throat:      Mouth: Mucous membranes are moist.   Eyes:      Extraocular Movements: Extraocular movements intact.      Conjunctiva/sclera: Conjunctivae normal.      Pupils: Pupils are equal, round, and reactive to light.   Cardiovascular:      Rate and Rhythm: Normal rate and regular rhythm.      Pulses: Normal pulses.   Pulmonary:      Effort: Pulmonary effort is normal.   Abdominal:      General: There is no distension.      Palpations: There is no mass.      Tenderness: There is no abdominal tenderness.   Musculoskeletal:         General: No swelling or tenderness.      Cervical back: No tenderness.      Right lower leg: No edema.      Left lower leg: No edema.   Lymphadenopathy:      Cervical: No cervical adenopathy.   Skin:     General: Skin is warm.      Capillary Refill: Capillary refill takes less than 2 seconds.   Neurological:      General: No focal deficit present.      Mental Status: He is alert and oriented to person, place, and time.   Psychiatric:         Behavior: Behavior normal.         Thought Content: Thought content normal.         Judgment: Judgment normal.      Comments: PHQ-2/9 Depression Screening    Little interest or pleasure in doing things: 0 - not at all  Feeling down, depressed, or hopeless: 0 - not at all  PHQ-2 Score: 0  PHQ-2 " Interpretation: Negative depression screen     LAQUITA-7 Flowsheet Screening    Flowsheet Row Most Recent Value   Over the last 2 weeks, how often have you been bothered by any of the   following problems?    Feeling nervous, anxious, or on edge 1   Not being able to stop or control worrying 1   Worrying too much about different things 0   Trouble relaxing 0   Being so restless that it is hard to sit still 0   Becoming easily annoyed or irritable 0   Feeling afraid as if something awful might happen 1   LAQUITA-7 Total Score 3             Vimal Thomas MD

## 2024-01-16 PROBLEM — F41.8 SITUATIONAL ANXIETY: Status: ACTIVE | Noted: 2024-01-16

## 2024-06-19 DIAGNOSIS — K52.9 CHRONIC DIARRHEA: ICD-10-CM

## 2024-06-19 RX ORDER — CHOLESTYRAMINE 4 G/9G
1 POWDER, FOR SUSPENSION ORAL 2 TIMES DAILY WITH MEALS
Qty: 60 PACKET | Refills: 0 | Status: SHIPPED | OUTPATIENT
Start: 2024-06-19

## 2024-07-10 ENCOUNTER — TELEPHONE (OUTPATIENT)
Dept: FAMILY MEDICINE CLINIC | Facility: CLINIC | Age: 24
End: 2024-07-10

## 2024-07-18 ENCOUNTER — OFFICE VISIT (OUTPATIENT)
Dept: FAMILY MEDICINE CLINIC | Facility: CLINIC | Age: 24
End: 2024-07-18
Payer: OTHER GOVERNMENT

## 2024-07-18 VITALS
DIASTOLIC BLOOD PRESSURE: 74 MMHG | SYSTOLIC BLOOD PRESSURE: 110 MMHG | HEART RATE: 74 BPM | WEIGHT: 152 LBS | BODY MASS INDEX: 23.04 KG/M2 | TEMPERATURE: 97.5 F | HEIGHT: 68 IN | OXYGEN SATURATION: 99 %

## 2024-07-18 DIAGNOSIS — F41.8 SITUATIONAL ANXIETY: ICD-10-CM

## 2024-07-18 DIAGNOSIS — K52.9 CHRONIC DIARRHEA: Primary | ICD-10-CM

## 2024-07-18 PROCEDURE — 99214 OFFICE O/P EST MOD 30 MIN: CPT | Performed by: FAMILY MEDICINE

## 2024-07-18 NOTE — PROGRESS NOTES
Ambulatory Visit  Name: Timoteo Lagunas      : 2000      MRN: [id number]  Encounter Provider: Vimal Thomas MD  Encounter Date: 2024   Encounter department: [facility]    Assessment & Plan   1. Chronic diarrhea  Assessment & Plan:  I reviewed with pt. We discussed treatment - present care (probiotic with cholestyramine) has been working well. Continue present care. Recheck 6m - earlier if symptoms worsen  2. Situational anxiety  Assessment & Plan:  I reviewed with pt  Primarily situational. Continue present care. Recheck 6m         History of Present Illness     f/u multiple med issues  - pt finds that if he takes a probiotic with the cholestyramine, he has better control of his stomach pain and diarrhea.  He denies any new symptoms  - mood/anxiety stable.   - pt denies any other new concerns      Review of Systems   Constitutional:  Positive for fatigue. Negative for activity change and appetite change.   HENT: Negative.     Eyes: Negative.    Respiratory: Negative.     Cardiovascular: Negative.    Gastrointestinal:  Positive for diarrhea (intermittent). Negative for abdominal distention, abdominal pain, nausea and vomiting.   Genitourinary: Negative.    Musculoskeletal: Negative.    Skin: Negative.    Neurological: Negative.    Psychiatric/Behavioral:  Negative for decreased concentration and sleep disturbance. The patient is nervous/anxious.      Past Medical History:   Diagnosis Date   • Diarrhea      Past Surgical History:   Procedure Laterality Date   • NO PAST SURGERIES       Family History   Problem Relation Age of Onset   • No Known Problems Mother    • Parkinsonism Paternal Grandfather      Social History     Tobacco Use   • Smoking status: Never   • Smokeless tobacco: Never   Vaping Use   • Vaping status: Never Used   Substance and Sexual Activity   • Alcohol use: Never   • Drug use: Never   • Sexual activity: Yes     Partners: Female     Birth  "control/protection: Condom Male     Current Outpatient Medications on File Prior to Visit   Medication Sig   • cholestyramine (QUESTRAN) 4 g packet Take 1 packet (4 g total) by mouth 2 (two) times a day with meals     No Known Allergies  Immunization History   Administered Date(s) Administered   • Adenovirus, unspecified 06/30/2017   • Anthrax 10/15/2021, 11/23/2021   • COVID-19 MODERNA VACC 0.5 ML IM 06/16/2021, 07/18/2021   • DT (pediatric) 07/19/2004   • DTP 1999, 1999, 1999, 01/10/2001   • Hep A / Hep B 06/30/2017, 09/05/2017   • Hep A, adult 10/19/2018   • Hep B, adult 1999, 1999, 03/31/2000, 01/25/2020   • Hib (PRP-OMP) 1999, 1999, 1999, 09/27/2000   • INFLUENZA 10/01/2018, 10/15/2021   • IPV 1999, 1999, 01/10/2001, 07/19/2004, 06/30/2017   • Influenza Quadrivalent Preservative Free 3 years and older IM 10/20/2018, 10/05/2019, 10/23/2020   • Influenza, seasonal, injectable, preservative free 11/17/2017   • MMR 09/27/2000, 07/19/2004, 06/30/2017, 09/05/2017   • Meningococcal MCV4P 06/30/2017   • Meningococcal, Unknown Serogroups 03/09/2011   • Tdap 03/09/2011, 06/30/2016, 06/30/2017   • Typhoid, ViCPs 10/15/2021   • Varicella 09/27/2000, 03/04/2009     Objective     /74 (BP Location: Left arm, Patient Position: Sitting, Cuff Size: Adult)   Pulse 74   Temp 97.5 °F (36.4 °C)   Ht 5' 7.75\" (1.721 m)   Wt 68.9 kg (152 lb)   SpO2 99%   BMI 23.28 kg/m²     Physical Exam  Vitals reviewed.   HENT:      Head: Normocephalic.      Mouth/Throat:      Mouth: Mucous membranes are moist.   Eyes:      General: No scleral icterus.     Extraocular Movements: Extraocular movements intact.      Conjunctiva/sclera: Conjunctivae normal.      Pupils: Pupils are equal, round, and reactive to light.   Cardiovascular:      Rate and Rhythm: Normal rate and regular rhythm.      Pulses: Normal pulses.   Pulmonary:      Effort: Pulmonary effort is normal.   Abdominal: "      General: There is no distension.      Palpations: There is no mass.      Tenderness: There is no abdominal tenderness. There is no right CVA tenderness, left CVA tenderness, guarding or rebound.   Musculoskeletal:         General: No tenderness or deformity. Normal range of motion.      Cervical back: No tenderness.      Right lower leg: No edema.      Left lower leg: No edema.   Lymphadenopathy:      Cervical: No cervical adenopathy.   Skin:     General: Skin is warm.   Neurological:      Mental Status: He is alert.   Psychiatric:         Mood and Affect: Mood normal.

## 2024-07-21 NOTE — ASSESSMENT & PLAN NOTE
I reviewed with pt. We discussed treatment - present care (probiotic with cholestyramine) has been working well. Continue present care. Recheck 6m - earlier if symptoms worsen

## 2024-08-14 ENCOUNTER — TELEPHONE (OUTPATIENT)
Age: 24
End: 2024-08-14

## 2024-08-14 NOTE — TELEPHONE ENCOUNTER
Timoteo called in stated that he needs a letter from Dr Vail for disability claim that he would be filing soon. [facility] would [facility]. Please Advise Thank you

## 2024-10-15 DIAGNOSIS — K52.9 CHRONIC DIARRHEA: ICD-10-CM

## 2024-10-17 RX ORDER — CHOLESTYRAMINE 4 G/9G
1 POWDER, FOR SUSPENSION ORAL 2 TIMES DAILY WITH MEALS
Qty: 60 PACKET | Refills: 5 | Status: SHIPPED | OUTPATIENT
Start: 2024-10-17

## 2024-11-27 ENCOUNTER — TELEPHONE (OUTPATIENT)
Age: 24
End: 2024-11-27

## 2024-11-27 NOTE — TELEPHONE ENCOUNTER
Faxing medical request form  Department of  affairs.     Please check [employer]. Ref #[id number]  [phone]

## 2025-07-21 ENCOUNTER — OFFICE VISIT (OUTPATIENT)
Dept: FAMILY MEDICINE CLINIC | Facility: CLINIC | Age: 25
End: 2025-07-21
Payer: OTHER GOVERNMENT

## 2025-07-21 VITALS
HEIGHT: 68 IN | DIASTOLIC BLOOD PRESSURE: 78 MMHG | BODY MASS INDEX: 23.22 KG/M2 | HEART RATE: 65 BPM | TEMPERATURE: 97.2 F | WEIGHT: 153.2 LBS | OXYGEN SATURATION: 99 % | SYSTOLIC BLOOD PRESSURE: 120 MMHG

## 2025-07-21 DIAGNOSIS — Z00.00 ANNUAL PHYSICAL EXAM: Primary | ICD-10-CM

## 2025-07-21 DIAGNOSIS — Z13.6 SCREENING FOR CARDIOVASCULAR CONDITION: ICD-10-CM

## 2025-07-21 DIAGNOSIS — F41.8 SITUATIONAL ANXIETY: ICD-10-CM

## 2025-07-21 DIAGNOSIS — K52.9 CHRONIC DIARRHEA: ICD-10-CM

## 2025-07-21 PROCEDURE — 99395 PREV VISIT EST AGE 18-39: CPT | Performed by: FAMILY MEDICINE

## 2025-07-21 PROCEDURE — 99213 OFFICE O/P EST LOW 20 MIN: CPT | Performed by: FAMILY MEDICINE

## 2025-07-21 NOTE — PROGRESS NOTES
Adult Annual Physical  Name: Timoteo Lagunas      : 2000      MRN: [id number]  Encounter Provider: Vimal Thomas MD  Encounter Date: 2025   Encounter department: [facility]    :  Assessment & Plan  Annual physical exam         Chronic diarrhea  Improved once triggers were found and eliminated. Does not need cholestyramine at present - will d/c from med list. Restart if symptoms return. Recheck 1y       Situational anxiety  Improved.  Continue to monitor. Recheck 1y - earlier if worse       Screening for cardiovascular condition    Orders:  •  CBC and differential; Future  •  Comprehensive metabolic panel; Future  •  Lipid panel; Future      Preventive Screenings:    - Prostate cancer screening: screening not indicated          History of Present Illness     Adult Annual Physical:  Patient presents for annual physical.   - pt stopped cholestyramine. He adjusted his diet after finding some food and supplement triggers (coffee, melatonin, spicy food). Since eliminating triggers, he found that cholestyramine was causing constipation.  Symptoms have improved since stopping cholestyramine and avoiding triggers.  - situational anxiety unchanged. Pt continues to have more good days than bad re: anxiety. LAQUITA done.     Diet and Physical Activity:  - Diet/Nutrition: no special diet. avoids diarrhea triggering foods  - Exercise: moderate cardiovascular exercise, vigorous cardiovascular exercise, 5-7 times a week on average and strength training exercises.    Depression Screening:  - PHQ-2 Score: 0    General Health:  - Sleep: > 8 hours of sleep on average.  - Hearing: normal hearing bilateral ears.  - Vision: most recent eye exam < 1 year ago, wears glasses and no vision problems.  - Dental: regular dental visits, brushes teeth twice daily and floss regularly.    /GYN Health:  - Follows with GYN: no.   - History of STDs: no     Health:  - History of STDs: no.   - Urinary  "symptoms: none.     Advanced Care Planning:  - Has an advanced directive?: no    - Has a durable medical POA?: no    - ACP document given to patient?: no      Review of Systems   Constitutional: Negative.    HENT: Negative.     Eyes: Negative.    Respiratory: Negative.     Cardiovascular: Negative.    Gastrointestinal: Negative.    Endocrine: Negative.    Genitourinary: Negative.    Musculoskeletal: Negative.    Skin: Negative.    Allergic/Immunologic: Negative.    Neurological: Negative.    Hematological: Negative.    Psychiatric/Behavioral:  Negative for dysphoric mood and sleep disturbance. The patient is nervous/anxious (situational).          Objective   /78   Pulse 65   Temp (!) 97.2 °F (36.2 °C)   Ht 5' 7.72\" (1.72 m)   Wt 69.5 kg (153 lb 3.2 oz)   SpO2 99%   BMI 23.49 kg/m²     Physical Exam  Vitals reviewed.   Constitutional:       Appearance: He is well-developed.   HENT:      Head: Normocephalic and atraumatic.      Right Ear: Tympanic membrane, ear canal and external ear normal.      Left Ear: Tympanic membrane, ear canal and external ear normal.      Nose: Nose normal.      Mouth/Throat:      Mouth: Mucous membranes are moist.     Eyes:      Extraocular Movements: Extraocular movements intact.      Conjunctiva/sclera: Conjunctivae normal.      Pupils: Pupils are equal, round, and reactive to light.     Neck:      Thyroid: No thyromegaly.      Vascular: No JVD.     Cardiovascular:      Rate and Rhythm: Normal rate and regular rhythm.      Pulses: Normal pulses.      Heart sounds: Normal heart sounds. No murmur heard.  Pulmonary:      Effort: Pulmonary effort is normal. No respiratory distress.      Breath sounds: Normal breath sounds. No wheezing or rales.   Abdominal:      General: Bowel sounds are normal. There is no distension.      Palpations: Abdomen is soft. There is no mass.      Tenderness: There is no abdominal tenderness.     Musculoskeletal:         General: No swelling, tenderness " or deformity. Normal range of motion.      Cervical back: Normal range of motion and neck supple. No tenderness. No muscular tenderness.      Right lower leg: No edema.      Left lower leg: No edema.   Lymphadenopathy:      Cervical: No cervical adenopathy.     Skin:     General: Skin is warm.      Capillary Refill: Capillary refill takes less than 2 seconds.     Neurological:      Mental Status: He is alert and oriented to person, place, and time.      Cranial Nerves: No cranial nerve deficit.      Sensory: No sensory deficit.      Motor: No weakness or abnormal muscle tone.      Coordination: Coordination normal.      Gait: Gait normal.      Deep Tendon Reflexes: Reflexes normal.     Psychiatric:         Mood and Affect: Mood normal.         Behavior: Behavior normal.         Thought Content: Thought content normal.         Judgment: Judgment normal.      Comments: PHQ-2/9 Depression Screening    Little interest or pleasure in doing things: 0 - not at all  Feeling down, depressed, or hopeless: 0 - not at all  PHQ-2 Score: 0  PHQ-2 Interpretation: Negative depression screen     LAQUITA-7 Flowsheet Screening    Flowsheet Row Most Recent Value   Over the last two weeks, how often have you been bothered by the following   problems?     Feeling nervous, anxious, or on edge 0   Not being able to stop or control worrying 0   Worrying too much about different things 0   Trouble relaxing  0   Being so restless that it's hard to sit still 0   Becoming easily annoyed or irritable  0   Feeling afraid as if something awful might happen 0   How difficult have these problems made it for you to do your work, take   care of things at home, or get along with other people?  Not difficult at   all   LAQUITA Score  0

## 2025-07-21 NOTE — ASSESSMENT & PLAN NOTE
Improved once triggers were found and eliminated. Does not need cholestyramine at present - will d/c from med list. Restart if symptoms return. Recheck 1y

## 2025-07-27 LAB
ALBUMIN SERPL-MCNC: 4.9 G/DL (ref 3.6–5.1)
ALBUMIN/GLOB SERPL: 2.6 (CALC) (ref 1–2.5)
ALP SERPL-CCNC: 59 U/L (ref 36–130)
ALT SERPL-CCNC: 18 U/L (ref 9–46)
AST SERPL-CCNC: 13 U/L (ref 10–40)
BASOPHILS # BLD AUTO: 40 CELLS/UL (ref 0–200)
BASOPHILS NFR BLD AUTO: 1.2 %
BILIRUB SERPL-MCNC: 0.6 MG/DL (ref 0.2–1.2)
BUN SERPL-MCNC: 15 MG/DL (ref 7–25)
BUN/CREAT SERPL: ABNORMAL (CALC) (ref 6–22)
CALCIUM SERPL-MCNC: 9.6 MG/DL (ref 8.6–10.3)
CHLORIDE SERPL-SCNC: 106 MMOL/L (ref 98–110)
CHOLEST SERPL-MCNC: 203 MG/DL
CHOLEST/HDLC SERPL: 3.2 (CALC)
CO2 SERPL-SCNC: 30 MMOL/L (ref 20–32)
CREAT SERPL-MCNC: 0.82 MG/DL (ref 0.6–1.24)
EOSINOPHIL # BLD AUTO: 59 CELLS/UL (ref 15–500)
EOSINOPHIL NFR BLD AUTO: 1.8 %
ERYTHROCYTE [DISTWIDTH] IN BLOOD BY AUTOMATED COUNT: 11.7 % (ref 11–15)
GFR/BSA.PRED SERPLBLD CYS-BASED-ARV: 124 ML/MIN/1.73M2
GLOBULIN SER CALC-MCNC: 1.9 G/DL (CALC) (ref 1.9–3.7)
GLUCOSE SERPL-MCNC: 80 MG/DL (ref 65–99)
HCT VFR BLD AUTO: 47.9 % (ref 38.5–50)
HDLC SERPL-MCNC: 63 MG/DL
HGB BLD-MCNC: 15.6 G/DL (ref 13.2–17.1)
LDLC SERPL CALC-MCNC: 128 MG/DL (CALC)
LYMPHOCYTES # BLD AUTO: 1254 CELLS/UL (ref 850–3900)
LYMPHOCYTES NFR BLD AUTO: 38 %
MCH RBC QN AUTO: 31.3 PG (ref 27–33)
MCHC RBC AUTO-ENTMCNC: 32.6 G/DL (ref 32–36)
MCV RBC AUTO: 96.2 FL (ref 80–100)
MONOCYTES # BLD AUTO: 198 CELLS/UL (ref 200–950)
MONOCYTES NFR BLD AUTO: 6 %
NEUTROPHILS # BLD AUTO: 1749 CELLS/UL (ref 1500–7800)
NEUTROPHILS NFR BLD AUTO: 53 %
NONHDLC SERPL-MCNC: 140 MG/DL (CALC)
PLATELET # BLD AUTO: 246 THOUSAND/UL (ref 140–400)
PMV BLD REES-ECKER: 9.3 FL (ref 7.5–12.5)
POTASSIUM SERPL-SCNC: 4.8 MMOL/L (ref 3.5–5.3)
PROT SERPL-MCNC: 6.8 G/DL (ref 6.1–8.1)
RBC # BLD AUTO: 4.98 MILLION/UL (ref 4.2–5.8)
SODIUM SERPL-SCNC: 141 MMOL/L (ref 135–146)
TRIGL SERPL-MCNC: 41 MG/DL
WBC # BLD AUTO: 3.3 THOUSAND/UL (ref 3.8–10.8)